# Patient Record
Sex: MALE | Race: OTHER | Employment: FULL TIME | URBAN - METROPOLITAN AREA
[De-identification: names, ages, dates, MRNs, and addresses within clinical notes are randomized per-mention and may not be internally consistent; named-entity substitution may affect disease eponyms.]

---

## 2017-01-11 ENCOUNTER — OFFICE VISIT (OUTPATIENT)
Dept: FAMILY MEDICINE CLINIC | Facility: CLINIC | Age: 42
End: 2017-01-11

## 2017-01-11 VITALS
WEIGHT: 144 LBS | HEART RATE: 77 BPM | HEIGHT: 62 IN | BODY MASS INDEX: 26.5 KG/M2 | SYSTOLIC BLOOD PRESSURE: 110 MMHG | DIASTOLIC BLOOD PRESSURE: 78 MMHG | TEMPERATURE: 98 F | RESPIRATION RATE: 15 BRPM

## 2017-01-11 DIAGNOSIS — G40.909 NONINTRACTABLE EPILEPSY WITHOUT STATUS EPILEPTICUS, UNSPECIFIED EPILEPSY TYPE (HCC): ICD-10-CM

## 2017-01-11 DIAGNOSIS — Z00.00 ROUTINE PHYSICAL EXAMINATION: Primary | ICD-10-CM

## 2017-01-11 PROCEDURE — 99396 PREV VISIT EST AGE 40-64: CPT | Performed by: FAMILY MEDICINE

## 2017-01-11 PROCEDURE — G0438 PPPS, INITIAL VISIT: HCPCS | Performed by: FAMILY MEDICINE

## 2017-01-11 NOTE — PROGRESS NOTES
Blood pressure 110/78, pulse 77, temperature 97.8 °F (36.6 °C), temperature source Oral, resp. rate 15, height 5' 2\" (1.575 m), weight 144 lb (65.318 kg).   REASON FOR VISIT:    Dragan Contreras is a 37year old male who presents for an [facility] previous visit. Fecal Occult Blood  Annually No results found for: FOB, OCCULTSTOOL    Obesity Screening Screen all adults annually Body mass index is 26.33 kg/(m^2).       Preventive Services for Which Recommendations Vary with Risk Recommendation Inter ALLERGIES:   No Known Allergies    CURRENT MEDICATIONS:     Current Outpatient Prescriptions:  levETIRAcetam (KEPPRA) 500 MG Oral Tab Take 1 tablet (500 mg total) by mouth 2 (two) times daily.  (Patient taking differently: Take 250 mg by mouth dashawn clear to auscultation  CARDIO: RRR without murmur  GI: good BS's, no masses, HSM or tenderness  : two descended testes, no masses, no hernia, no penile lesions  RECTAL: good rectal tone, prostate shows no masses  MUSCULOSKELETAL: back is not tender, FROM

## 2017-01-14 ENCOUNTER — APPOINTMENT (OUTPATIENT)
Dept: LAB | Age: 42
End: 2017-01-14
Attending: FAMILY MEDICINE
Payer: COMMERCIAL

## 2017-01-14 DIAGNOSIS — G40.909 NONINTRACTABLE EPILEPSY WITHOUT STATUS EPILEPTICUS, UNSPECIFIED EPILEPSY TYPE (HCC): ICD-10-CM

## 2017-01-14 DIAGNOSIS — Z00.00 ROUTINE PHYSICAL EXAMINATION: ICD-10-CM

## 2017-01-14 LAB
CHOLEST SERPL-MCNC: 168 MG/DL (ref 110–200)
GLUCOSE SERPL-MCNC: 100 MG/DL (ref 70–99)
HDLC SERPL-MCNC: 38 MG/DL
LDLC SERPL CALC-MCNC: 106 MG/DL (ref 0–99)
NONHDLC SERPL-MCNC: 130 MG/DL
TRIGL SERPL-MCNC: 120 MG/DL (ref 1–149)
TSH SERPL-ACNC: 1.11 UIU/ML (ref 0.34–5.6)

## 2017-01-14 PROCEDURE — 84443 ASSAY THYROID STIM HORMONE: CPT

## 2017-01-14 PROCEDURE — 82947 ASSAY GLUCOSE BLOOD QUANT: CPT

## 2017-01-14 PROCEDURE — 80061 LIPID PANEL: CPT

## 2017-01-14 PROCEDURE — 36415 COLL VENOUS BLD VENIPUNCTURE: CPT

## 2017-01-14 PROCEDURE — 80177 DRUG SCRN QUAN LEVETIRACETAM: CPT

## 2017-01-17 LAB — LEVETIRACETAM (KEPPRA): <2 UG/ML

## 2017-01-18 ENCOUNTER — TELEPHONE (OUTPATIENT)
Dept: FAMILY MEDICINE CLINIC | Facility: CLINIC | Age: 42
End: 2017-01-18

## 2017-01-18 DIAGNOSIS — G40.909 NONINTRACTABLE EPILEPSY WITHOUT STATUS EPILEPTICUS, UNSPECIFIED EPILEPSY TYPE (HCC): Primary | ICD-10-CM

## 2017-03-24 ENCOUNTER — TELEPHONE (OUTPATIENT)
Dept: NEUROLOGY | Facility: CLINIC | Age: 42
End: 2017-03-24

## 2017-03-27 ENCOUNTER — OFFICE VISIT (OUTPATIENT)
Dept: NEUROLOGY | Facility: CLINIC | Age: 42
End: 2017-03-27

## 2017-03-27 VITALS
HEART RATE: 86 BPM | WEIGHT: 144 LBS | BODY MASS INDEX: 26.5 KG/M2 | HEIGHT: 62 IN | OXYGEN SATURATION: 97 % | SYSTOLIC BLOOD PRESSURE: 118 MMHG | DIASTOLIC BLOOD PRESSURE: 74 MMHG

## 2017-03-27 DIAGNOSIS — G40.909 SEIZURE DISORDER (HCC): Primary | ICD-10-CM

## 2017-03-27 PROCEDURE — 99213 OFFICE O/P EST LOW 20 MIN: CPT | Performed by: OTHER

## 2017-03-27 NOTE — PROGRESS NOTES
Audrey Reyna : 1975     HPI:   Patient presents with:  Seizure Disorder (neurologic): LOV:05/10/16 with Anny Feliciano. Blood test for LEVETIRACETAM level done on 17 here to discuss results.  Pt reports he has not had any seizure activity since lov Number of children:               Social History Main Topics    Smoking Status: Never Smoker                      Smokeless Status: Never Used                        Alcohol Use: No              Drug Use: No            Other Topics            Concer no clonus  Coordination: Finger to nose, heel to shin intact bilaterally. Gait: Narrow based, negative Romberg’s sign. Can stand on heels and toes.     ASSESSMENT AND PLAN:     Mickey Zhou 37year old male presents to clinic with history of a seizure

## 2017-09-07 ENCOUNTER — OFFICE VISIT (OUTPATIENT)
Dept: NEUROLOGY | Facility: CLINIC | Age: 42
End: 2017-09-07

## 2017-09-07 VITALS
WEIGHT: 140 LBS | BODY MASS INDEX: 23.32 KG/M2 | SYSTOLIC BLOOD PRESSURE: 102 MMHG | RESPIRATION RATE: 16 BRPM | HEIGHT: 65 IN | HEART RATE: 64 BPM | DIASTOLIC BLOOD PRESSURE: 64 MMHG

## 2017-09-07 DIAGNOSIS — R56.9 SEIZURE (HCC): ICD-10-CM

## 2017-09-07 PROCEDURE — 99213 OFFICE O/P EST LOW 20 MIN: CPT | Performed by: OTHER

## 2017-09-07 RX ORDER — LEVETIRACETAM 500 MG/1
250 TABLET ORAL 2 TIMES DAILY
Qty: 90 TABLET | Refills: 3 | Status: SHIPPED | OUTPATIENT
Start: 2017-09-07 | End: 2017-12-06

## 2017-09-07 RX ORDER — LEVETIRACETAM 500 MG/1
250 TABLET ORAL DAILY
COMMUNITY
Start: 2017-09-07 | End: 2017-09-07

## 2017-09-07 NOTE — PROGRESS NOTES
Tracie Flores : 1975     HPI:   Patient presents with:  Seizure Disorder (neurologic): LOV: 3/27/17. F/U on seizures. Patient states that he has been doing well since last visit. He has not had any seizures and has not had any new symptoms.  Patient Smokeless tobacco: Never Used                        Alcohol use:  No              Drug use: No            Other Topics            Concern  Caffeine Concern        No    Comment:1 cup coffee daily  Sleep Concern           No  Exercise lower extremities,  No Babinski, no hoffmans, no clonus  Coordination: Finger to nose, heel to shin intact bilaterally. Gait: Narrow based, negative Romberg’s sign. Can stand on heels and toes.     ASSESSMENT AND PLAN:     Kwasi Chappell 40year old male

## 2018-03-19 ENCOUNTER — TELEPHONE (OUTPATIENT)
Dept: FAMILY MEDICINE CLINIC | Facility: CLINIC | Age: 43
End: 2018-03-19

## 2018-03-19 ENCOUNTER — NURSE TRIAGE (OUTPATIENT)
Dept: FAMILY MEDICINE CLINIC | Facility: CLINIC | Age: 43
End: 2018-03-19

## 2018-03-19 DIAGNOSIS — G40.A09 NONINTRACTABLE ABSENCE EPILEPSY WITHOUT STATUS EPILEPTICUS (HCC): Primary | ICD-10-CM

## 2018-03-19 DIAGNOSIS — E78.00 PURE HYPERCHOLESTEROLEMIA: ICD-10-CM

## 2018-03-19 NOTE — TELEPHONE ENCOUNTER
Italian speaking - pt stts he would like a refill on Rx Paroxetine 10 MG.   Pt has appt for PX 4/10Please advise

## 2018-03-20 NOTE — TELEPHONE ENCOUNTER
IWLL GRISSOM [facility]    Called patient and advised Dr Donell Farley note, was able to make OV for tomorrow and then changed his mind, cancelled the OV for tomorrow and states that he will wait until next OV for annual px 4/10/18, advised to go to ER if symptoms getting worse, advised  that medcation cannot be refilled until the next OV, states that he will wait next month. Note      PATIENT TO COME IN FOR APPT TOMORROW.

## 2018-04-07 ENCOUNTER — APPOINTMENT (OUTPATIENT)
Dept: LAB | Age: 43
End: 2018-04-07
Attending: FAMILY MEDICINE
Payer: COMMERCIAL

## 2018-04-07 DIAGNOSIS — G40.A09 NONINTRACTABLE ABSENCE EPILEPSY WITHOUT STATUS EPILEPTICUS (HCC): ICD-10-CM

## 2018-04-07 DIAGNOSIS — E78.00 PURE HYPERCHOLESTEROLEMIA: ICD-10-CM

## 2018-04-07 PROCEDURE — 80061 LIPID PANEL: CPT

## 2018-04-07 PROCEDURE — 80048 BASIC METABOLIC PNL TOTAL CA: CPT

## 2018-04-07 PROCEDURE — 84460 ALANINE AMINO (ALT) (SGPT): CPT

## 2018-04-07 PROCEDURE — 84443 ASSAY THYROID STIM HORMONE: CPT

## 2018-04-07 PROCEDURE — 80177 DRUG SCRN QUAN LEVETIRACETAM: CPT

## 2018-04-07 PROCEDURE — 36415 COLL VENOUS BLD VENIPUNCTURE: CPT

## 2018-04-07 PROCEDURE — 84450 TRANSFERASE (AST) (SGOT): CPT

## 2018-04-10 ENCOUNTER — OFFICE VISIT (OUTPATIENT)
Dept: FAMILY MEDICINE CLINIC | Facility: CLINIC | Age: 43
End: 2018-04-10

## 2018-04-10 VITALS
BODY MASS INDEX: 23.67 KG/M2 | HEIGHT: 65 IN | HEART RATE: 70 BPM | DIASTOLIC BLOOD PRESSURE: 67 MMHG | SYSTOLIC BLOOD PRESSURE: 106 MMHG | WEIGHT: 142.06 LBS

## 2018-04-10 DIAGNOSIS — G40.802 OTHER EPILEPSY WITHOUT STATUS EPILEPTICUS, NOT INTRACTABLE (HCC): ICD-10-CM

## 2018-04-10 DIAGNOSIS — F52.4 PREMATURE EJACULATION: ICD-10-CM

## 2018-04-10 DIAGNOSIS — Z00.00 ROUTINE PHYSICAL EXAMINATION: Primary | ICD-10-CM

## 2018-04-10 PROCEDURE — 99396 PREV VISIT EST AGE 40-64: CPT | Performed by: FAMILY MEDICINE

## 2018-04-10 RX ORDER — LEVETIRACETAM 500 MG/1
TABLET ORAL
Refills: 3 | COMMUNITY
Start: 2018-03-03 | End: 2019-03-04

## 2018-04-10 NOTE — PROGRESS NOTES
Blood pressure 106/67, pulse 70, height 5' 5\" (1.651 m), weight 142 lb 1 oz (64.4 kg). REASON FOR VISIT:    Turner Badillo is a 40year old male who presents for an [address].         Patient Active Problem List:     Routine general medica Screen pts at high risk plus screen one time for adults born [address] No results found for: HCVAB   Tuberculosis Screen If high risk No components found for: [address] Internal Lab or Procedure   No disease specific di are clear  EYES:PERRLA, EOMI, conjunctiva are clear.     NECK: supple, no adenopathy, no bruits  CHEST: no chest tenderness  LUNGS: clear to auscultation  CARDIO: RRR without murmur  GI: good BS's, no masses, HSM or tenderness  : two descended testes, no

## 2018-04-27 ENCOUNTER — OFFICE VISIT (OUTPATIENT)
Dept: FAMILY MEDICINE CLINIC | Facility: CLINIC | Age: 43
End: 2018-04-27

## 2018-04-27 ENCOUNTER — NURSE TRIAGE (OUTPATIENT)
Dept: OTHER | Age: 43
End: 2018-04-27

## 2018-04-27 VITALS
WEIGHT: 140 LBS | HEIGHT: 65 IN | SYSTOLIC BLOOD PRESSURE: 112 MMHG | DIASTOLIC BLOOD PRESSURE: 76 MMHG | BODY MASS INDEX: 23.32 KG/M2 | HEART RATE: 66 BPM

## 2018-04-27 DIAGNOSIS — N48.1 BALANITIS: Primary | ICD-10-CM

## 2018-04-27 PROCEDURE — 99213 OFFICE O/P EST LOW 20 MIN: CPT | Performed by: FAMILY MEDICINE

## 2018-04-27 PROCEDURE — 99212 OFFICE O/P EST SF 10 MIN: CPT | Performed by: FAMILY MEDICINE

## 2018-04-27 RX ORDER — FLUCONAZOLE 150 MG/1
150 TABLET ORAL ONCE
Qty: 1 TABLET | Refills: 1 | Status: SHIPPED | OUTPATIENT
Start: 2018-04-27 | End: 2018-04-27

## 2018-04-27 NOTE — PATIENT INSTRUCTIONS
Balanitis [Balanitis]    La Balanitis es evert inflamación en la eyal del pene (glande) Puede ocurrir a partir de evert acumulación de gérmenes (bacterias o virus) bajo el prepucio. Con mayor frecuencia se trata de evert complicación de la diabetes.  Nubia Lack

## 2018-04-27 NOTE — PROGRESS NOTES
Blood pressure 112/76, pulse 66, height 5' 5\" (1.651 m), weight 140 lb (63.5 kg). Two-week history of redness and white residue around his penis. He has not been circumcised. He reports that he otherwise feels well.   Denies dysuria or penile discharg

## 2018-04-27 NOTE — TELEPHONE ENCOUNTER
Action Requested: Summary for Provider     []  Critical Lab, Recommendations Needed  [] Need Additional Advice  []   FYI    []   Need Orders  [] Need Medications Sent to Pharmacy  []  Other     SUMMARY: Per kwesi Mcgowan for pt to come to office now.     Pt state

## 2018-06-07 ENCOUNTER — OFFICE VISIT (OUTPATIENT)
Dept: SURGERY | Facility: CLINIC | Age: 43
End: 2018-06-07

## 2018-06-07 ENCOUNTER — TELEPHONE (OUTPATIENT)
Dept: SURGERY | Facility: CLINIC | Age: 43
End: 2018-06-07

## 2018-06-07 VITALS
WEIGHT: 140 LBS | TEMPERATURE: 97 F | HEIGHT: 65 IN | BODY MASS INDEX: 23.32 KG/M2 | DIASTOLIC BLOOD PRESSURE: 68 MMHG | RESPIRATION RATE: 16 BRPM | SYSTOLIC BLOOD PRESSURE: 112 MMHG | HEART RATE: 71 BPM

## 2018-06-07 DIAGNOSIS — N47.1 PHIMOSIS: Primary | ICD-10-CM

## 2018-06-07 DIAGNOSIS — N48.1 BALANITIS: ICD-10-CM

## 2018-06-07 DIAGNOSIS — Z01.818 PREOP EXAMINATION: ICD-10-CM

## 2018-06-07 DIAGNOSIS — N48.1 BALANITIS: Primary | ICD-10-CM

## 2018-06-07 PROCEDURE — 99212 OFFICE O/P EST SF 10 MIN: CPT | Performed by: UROLOGY

## 2018-06-07 PROCEDURE — 99244 OFF/OP CNSLTJ NEW/EST MOD 40: CPT | Performed by: UROLOGY

## 2018-06-07 NOTE — TELEPHONE ENCOUNTER
Patient seen in office, scheduled for circumcision, Wednesday 08/01/18 @ 7:30, [facility], went over labs/pre-op with patient verbalized understanding.

## 2018-06-07 NOTE — PROGRESS NOTES
Shu Campbell is a 40year old male. HPI:   Patient presents with:  Circumcision: possible--pt states redness around the penis       81-year-old male who I seen previously for evaluation of premature ejaculation April 28, 2016.   Had been using Paxil 10 for management including intermittent use of topical antifungal or steroid ointments versus definitive circumcision. The risks benefits side effects and complications related to each of those were discussed with the patient.   Would prefer to proceed with

## 2018-07-24 ENCOUNTER — LAB ENCOUNTER (OUTPATIENT)
Dept: LAB | Age: 43
End: 2018-07-24
Attending: UROLOGY
Payer: COMMERCIAL

## 2018-07-24 DIAGNOSIS — Z01.818 PREOP EXAMINATION: ICD-10-CM

## 2018-07-24 LAB
ANION GAP SERPL CALC-SCNC: 9 MMOL/L (ref 0–18)
BASOPHILS # BLD: 0 K/UL (ref 0–0.2)
BASOPHILS NFR BLD: 0 %
BUN SERPL-MCNC: 8 MG/DL (ref 8–20)
BUN/CREAT SERPL: 7.5 (ref 10–20)
CALCIUM SERPL-MCNC: 9.2 MG/DL (ref 8.5–10.5)
CHLORIDE SERPL-SCNC: 104 MMOL/L (ref 95–110)
CO2 SERPL-SCNC: 26 MMOL/L (ref 22–32)
CREAT SERPL-MCNC: 1.06 MG/DL (ref 0.5–1.5)
EOSINOPHIL # BLD: 0.1 K/UL (ref 0–0.7)
EOSINOPHIL NFR BLD: 1 %
ERYTHROCYTE [DISTWIDTH] IN BLOOD BY AUTOMATED COUNT: 13 % (ref 11–15)
GLUCOSE SERPL-MCNC: 87 MG/DL (ref 70–99)
HCT VFR BLD AUTO: 48.2 % (ref 41–52)
HGB BLD-MCNC: 16 G/DL (ref 13.5–17.5)
LYMPHOCYTES # BLD: 1.4 K/UL (ref 1–4)
LYMPHOCYTES NFR BLD: 24 %
MCH RBC QN AUTO: 29.5 PG (ref 27–32)
MCHC RBC AUTO-ENTMCNC: 33.2 G/DL (ref 32–37)
MCV RBC AUTO: 88.9 FL (ref 80–100)
MONOCYTES # BLD: 0.6 K/UL (ref 0–1)
MONOCYTES NFR BLD: 10 %
NEUTROPHILS # BLD AUTO: 3.9 K/UL (ref 1.8–7.7)
NEUTROPHILS NFR BLD: 65 %
OSMOLALITY UR CALC.SUM OF ELEC: 286 MOSM/KG (ref 275–295)
PLATELET # BLD AUTO: 191 K/UL (ref 140–400)
PMV BLD AUTO: 9.6 FL (ref 7.4–10.3)
POTASSIUM SERPL-SCNC: 4.7 MMOL/L (ref 3.3–5.1)
RBC # BLD AUTO: 5.42 M/UL (ref 4.5–5.9)
SODIUM SERPL-SCNC: 139 MMOL/L (ref 136–144)
WBC # BLD AUTO: 5.9 K/UL (ref 4–11)

## 2018-07-24 PROCEDURE — 80048 BASIC METABOLIC PNL TOTAL CA: CPT

## 2018-07-24 PROCEDURE — 36415 COLL VENOUS BLD VENIPUNCTURE: CPT

## 2018-07-24 PROCEDURE — 85025 COMPLETE CBC W/AUTO DIFF WBC: CPT

## 2018-07-30 ENCOUNTER — TELEPHONE (OUTPATIENT)
Dept: SURGERY | Facility: CLINIC | Age: 43
End: 2018-07-30

## 2018-07-30 NOTE — TELEPHONE ENCOUNTER
Pt would like to cancel 8/1 sx due to copayments. Pls call to confirm sx has been cancelled. Thank you.

## 2018-07-31 NOTE — TELEPHONE ENCOUNTER
Spoke with patrizia, confirmed that patient' procedure is cancelled. L/m on patient' v/m confirming that procedure is cancelled for Wednesday 08/01/18.

## 2018-10-25 ENCOUNTER — OFFICE VISIT (OUTPATIENT)
Dept: FAMILY MEDICINE CLINIC | Facility: CLINIC | Age: 43
End: 2018-10-25

## 2018-10-25 VITALS
HEART RATE: 69 BPM | TEMPERATURE: 98 F | WEIGHT: 143 LBS | DIASTOLIC BLOOD PRESSURE: 78 MMHG | SYSTOLIC BLOOD PRESSURE: 125 MMHG | HEIGHT: 65 IN | BODY MASS INDEX: 23.82 KG/M2

## 2018-10-25 DIAGNOSIS — J06.9 URI, ACUTE: Primary | ICD-10-CM

## 2018-10-25 PROCEDURE — 99213 OFFICE O/P EST LOW 20 MIN: CPT | Performed by: FAMILY MEDICINE

## 2018-10-25 PROCEDURE — 87880 STREP A ASSAY W/OPTIC: CPT | Performed by: FAMILY MEDICINE

## 2018-10-25 PROCEDURE — 99212 OFFICE O/P EST SF 10 MIN: CPT | Performed by: FAMILY MEDICINE

## 2018-10-25 RX ORDER — IBUPROFEN 800 MG/1
800 TABLET ORAL EVERY 6 HOURS PRN
Qty: 90 TABLET | Refills: 0 | Status: SHIPPED | OUTPATIENT
Start: 2018-10-25 | End: 2020-02-11

## 2018-10-25 NOTE — PROGRESS NOTES
Blood pressure 125/78, pulse 69, temperature 97.9 °F (36.6 °C), temperature source Oral, height 5' 5\" (1.651 m), weight 143 lb (64.9 kg). Patient presents today reporting he has had a sore throat for 3 days.   Minimal nasal congestion minimal cough that

## 2018-10-25 NOTE — PATIENT INSTRUCTIONS
Tromboflebitis superficial [Thrombophlebitis, Superficial]  Las venas superficiales son las venas que están cerca de la superficie de la piel.  La tromboflebitis superficial es un problema que ocurre cuando evert o más de estas venas se inflaman (se enrojec ? Use medias de compresión elásticas o vendajes megan le indiquen. ? Evite quedarse de pie o sentado quinten mucho rato. Levántese y camine con frecuencia.   Para ayudar a tratar un coágulo de chuy, es posible que le receten un diluyente de la chuy (ant

## 2019-03-04 NOTE — TELEPHONE ENCOUNTER
Pt is calling in reg to medication refill he is out          Current Outpatient Medications:        levETIRAcetam 500 MG Oral Tab  Disp:  Rfl: 3

## 2019-03-05 RX ORDER — LEVETIRACETAM 500 MG/1
TABLET ORAL
Qty: 30 TABLET | Refills: 3 | Status: SHIPPED | OUTPATIENT
Start: 2019-03-05 | End: 2019-10-22

## 2019-03-05 NOTE — TELEPHONE ENCOUNTER
Requested Prescriptions     Pending Prescriptions Disp Refills   • levETIRAcetam 500 MG Oral Tab  3       Last Office Visit with PCP: 10/25/2018  Last Blood Pressures:  BP Readings from Last 2 Encounters:  10/25/18 : 125/78  06/07/18 : 112/68    Rx listed

## 2019-10-26 RX ORDER — LEVETIRACETAM 500 MG/1
TABLET ORAL
Qty: 30 TABLET | Refills: 0 | OUTPATIENT
Start: 2019-10-26

## 2019-10-26 RX ORDER — LEVETIRACETAM 500 MG/1
TABLET ORAL
Qty: 45 TABLET | Refills: 0 | Status: SHIPPED | OUTPATIENT
Start: 2019-10-26 | End: 2019-11-20

## 2019-10-26 NOTE — TELEPHONE ENCOUNTER
SYLVESTER please assist with scheduling 6 month f/u appt, pt does not have mychart, thanks    Refill passed per [facility], [facility] protocol.   Refill Protocol Appointment Criteria  · Appointment scheduled in the past 6 months or in the next 3 months  Recent Outpati

## 2019-11-20 ENCOUNTER — OFFICE VISIT (OUTPATIENT)
Dept: FAMILY MEDICINE CLINIC | Facility: CLINIC | Age: 44
End: 2019-11-20

## 2019-11-20 VITALS
WEIGHT: 142 LBS | BODY MASS INDEX: 23.66 KG/M2 | SYSTOLIC BLOOD PRESSURE: 116 MMHG | DIASTOLIC BLOOD PRESSURE: 81 MMHG | HEART RATE: 76 BPM | HEIGHT: 65 IN

## 2019-11-20 DIAGNOSIS — G40.802 OTHER EPILEPSY WITHOUT STATUS EPILEPTICUS, NOT INTRACTABLE (HCC): Primary | ICD-10-CM

## 2019-11-20 PROCEDURE — 99213 OFFICE O/P EST LOW 20 MIN: CPT | Performed by: FAMILY MEDICINE

## 2019-11-20 RX ORDER — LEVETIRACETAM 250 MG/1
TABLET ORAL
Qty: 60 TABLET | Refills: 3 | Status: SHIPPED | OUTPATIENT
Start: 2019-11-20 | End: 2020-03-04

## 2019-11-20 NOTE — PROGRESS NOTES
Blood pressure 116/81, pulse 76, height 5' 5\" (1.651 m), weight 142 lb (64.4 kg). Patient presents today following up for epilepsy. He reports he had a seizure last about 15 years ago. He has had breakthrough epilepsy when off Keppra.   He otherwise f

## 2020-01-08 ENCOUNTER — OFFICE VISIT (OUTPATIENT)
Dept: FAMILY MEDICINE CLINIC | Facility: CLINIC | Age: 45
End: 2020-01-08

## 2020-01-08 VITALS
BODY MASS INDEX: 23.32 KG/M2 | HEIGHT: 65 IN | DIASTOLIC BLOOD PRESSURE: 76 MMHG | HEART RATE: 69 BPM | WEIGHT: 140 LBS | SYSTOLIC BLOOD PRESSURE: 120 MMHG

## 2020-01-08 DIAGNOSIS — F52.4 PREMATURE EJACULATION: ICD-10-CM

## 2020-01-08 DIAGNOSIS — F32.A DEPRESSION, UNSPECIFIED DEPRESSION TYPE: ICD-10-CM

## 2020-01-08 DIAGNOSIS — G40.802 OTHER EPILEPSY WITHOUT STATUS EPILEPTICUS, NOT INTRACTABLE (HCC): ICD-10-CM

## 2020-01-08 DIAGNOSIS — Z00.00 ROUTINE PHYSICAL EXAMINATION: Primary | ICD-10-CM

## 2020-01-08 PROCEDURE — G0438 PPPS, INITIAL VISIT: HCPCS | Performed by: FAMILY MEDICINE

## 2020-01-08 PROCEDURE — 99396 PREV VISIT EST AGE 40-64: CPT | Performed by: FAMILY MEDICINE

## 2020-01-08 PROCEDURE — 99213 OFFICE O/P EST LOW 20 MIN: CPT | Performed by: FAMILY MEDICINE

## 2020-01-08 PROCEDURE — 90471 IMMUNIZATION ADMIN: CPT | Performed by: FAMILY MEDICINE

## 2020-01-08 PROCEDURE — 90686 IIV4 VACC NO PRSV 0.5 ML IM: CPT | Performed by: FAMILY MEDICINE

## 2020-01-08 RX ORDER — PAROXETINE 10 MG/1
10 TABLET, FILM COATED ORAL EVERY MORNING
Qty: 30 TABLET | Refills: 2 | Status: SHIPPED | OUTPATIENT
Start: 2020-01-08 | End: 2020-02-11

## 2020-01-08 NOTE — PROGRESS NOTES
REASON FOR VISIT:    Turner Badillo is a 38year old male who presents for an [address]. Depression over family situation  has days where he feels like crying. No suicidal ideation no substance abuse.   He is interested in seeing a time for adults born [address] No results found for: HCVAB   Tuberculosis Screen If high risk No components found for: [address] Internal Lab or Procedure   No disease specific diagnoses    ALLERGIES:   No Known Allergi Encounters:  01/08/20 : 120/76  11/20/19 : 116/81  10/25/18 : 125/78      GENERAL: well developed, well nourished, in no apparent distress  SKIN: no rashes, no suspicious lesions  HEENT: atraumatic, normocephalic, ears and throat are clear  EYES:AD [place] Future  - GLUCOSE, SERUM; Future    3. Other epilepsy without status epilepticus, not intractable ([address]  Continue lamotrigine has follow-up appointment with neurology    4.  Premature ejaculation  Paroxetine

## 2020-01-11 ENCOUNTER — APPOINTMENT (OUTPATIENT)
Dept: LAB | Age: 45
End: 2020-01-11
Attending: FAMILY MEDICINE
Payer: COMMERCIAL

## 2020-01-11 DIAGNOSIS — F32.A DEPRESSION, UNSPECIFIED DEPRESSION TYPE: ICD-10-CM

## 2020-01-11 LAB
CHOLEST SMN-MCNC: 145 MG/DL (ref ?–200)
GLUCOSE BLD-MCNC: 88 MG/DL (ref 70–99)
HDLC SERPL-MCNC: 41 MG/DL (ref 40–59)
LDLC SERPL CALC-MCNC: 79 MG/DL (ref ?–100)
NONHDLC SERPL-MCNC: 104 MG/DL (ref ?–130)
PATIENT FASTING Y/N/NP: YES
PATIENT FASTING Y/N/NP: YES
TRIGL SERPL-MCNC: 123 MG/DL (ref 30–149)
TSI SER-ACNC: 0.9 MIU/ML (ref 0.36–3.74)
VLDLC SERPL CALC-MCNC: 25 MG/DL (ref 0–30)

## 2020-01-11 PROCEDURE — 84443 ASSAY THYROID STIM HORMONE: CPT

## 2020-01-11 PROCEDURE — 82947 ASSAY GLUCOSE BLOOD QUANT: CPT

## 2020-01-11 PROCEDURE — 80061 LIPID PANEL: CPT

## 2020-01-11 PROCEDURE — 36415 COLL VENOUS BLD VENIPUNCTURE: CPT

## 2020-01-13 ENCOUNTER — TELEPHONE (OUTPATIENT)
Dept: FAMILY MEDICINE CLINIC | Facility: CLINIC | Age: 45
End: 2020-01-13

## 2020-01-13 NOTE — TELEPHONE ENCOUNTER
Marilou Dean B, DO  [facility]             Labs normal.  Patient to fu in one month to see me for appt.

## 2020-01-14 NOTE — TELEPHONE ENCOUNTER
Pt returned phone call. Relayed results and scheduled a f/u appt w/ Dr. Sukumar Bush. Pt expressed understanding.

## 2020-01-25 RX ORDER — LEVETIRACETAM 500 MG/1
TABLET ORAL
Qty: 45 TABLET | Refills: 0 | OUTPATIENT
Start: 2020-01-25

## 2020-02-11 ENCOUNTER — OFFICE VISIT (OUTPATIENT)
Dept: FAMILY MEDICINE CLINIC | Facility: CLINIC | Age: 45
End: 2020-02-11

## 2020-02-11 VITALS
HEIGHT: 65 IN | SYSTOLIC BLOOD PRESSURE: 114 MMHG | HEART RATE: 61 BPM | BODY MASS INDEX: 23.32 KG/M2 | WEIGHT: 140 LBS | DIASTOLIC BLOOD PRESSURE: 76 MMHG | TEMPERATURE: 98 F

## 2020-02-11 DIAGNOSIS — F32.A DEPRESSION, UNSPECIFIED DEPRESSION TYPE: ICD-10-CM

## 2020-02-11 DIAGNOSIS — J06.9 URI, ACUTE: Primary | ICD-10-CM

## 2020-02-11 DIAGNOSIS — J02.9 SORE THROAT: ICD-10-CM

## 2020-02-11 LAB
CONTROL LINE PRESENT WITH A CLEAR BACKGROUND (YES/NO): YES YES/NO
KIT LOT #: NORMAL NUMERIC
STREP GRP A CUL-SCR: NEGATIVE

## 2020-02-11 PROCEDURE — 99213 OFFICE O/P EST LOW 20 MIN: CPT | Performed by: FAMILY MEDICINE

## 2020-02-11 PROCEDURE — 87880 STREP A ASSAY W/OPTIC: CPT | Performed by: FAMILY MEDICINE

## 2020-02-11 RX ORDER — ESCITALOPRAM OXALATE 5 MG/1
5 TABLET ORAL DAILY
Qty: 30 TABLET | Refills: 2 | Status: SHIPPED | OUTPATIENT
Start: 2020-02-11 | End: 2020-03-19

## 2020-02-11 NOTE — PROGRESS NOTES
Blood pressure 114/76, pulse 61, temperature 98.1 °F (36.7 °C), temperature source Oral, height 5' 5\" (1.651 m), weight 140 lb (63.5 kg). Patient presents today following up for depression. Family situation is the same.   Denies suicidal ideation or pa

## 2020-03-04 ENCOUNTER — OFFICE VISIT (OUTPATIENT)
Dept: NEUROLOGY | Facility: CLINIC | Age: 45
End: 2020-03-04

## 2020-03-04 VITALS — BODY MASS INDEX: 23.32 KG/M2 | HEIGHT: 65 IN | WEIGHT: 140 LBS

## 2020-03-04 DIAGNOSIS — G40.909 NONINTRACTABLE EPILEPSY WITHOUT STATUS EPILEPTICUS, UNSPECIFIED EPILEPSY TYPE (HCC): Primary | ICD-10-CM

## 2020-03-04 PROCEDURE — 99214 OFFICE O/P EST MOD 30 MIN: CPT | Performed by: OTHER

## 2020-03-04 RX ORDER — LEVETIRACETAM 250 MG/1
TABLET ORAL
Qty: 180 TABLET | Refills: 3 | Status: SHIPPED | OUTPATIENT
Start: 2020-03-04 | End: 2020-09-29

## 2020-03-04 NOTE — PROGRESS NOTES
Neurology Follow up Visit     Referred By: Dr. Lucy Marie    Chief Complaint: Patient presents with:  Seizure Disorder: LOV: 3/27/17 with Dr. Yadiel Stearns. F/U On seizures. Patient states that he has remained seizure free for about 13 years.  He states that he NAUSEA ONLY    ROS:   As in HPI, the rest of the 14 system review was done and was negative      Physical Exam:   03/04/20  1458   Weight: 140 lb (63.5 kg)   Height: 65\"       General: No apparent distress, well nourished, well groomed.   Head- No Component Value Date    HGB 16.0 07/24/2018    HCT 48.2 07/24/2018    MCV 88.9 07/24/2018    WBC 5.9 07/24/2018     07/24/2018      Lab Results   Component Value Date    BUN 8 07/24/2018    CA 9.2 07/24/2018    ALT 18 04/07/2018    AST 20 04/07/20

## 2020-03-19 RX ORDER — ESCITALOPRAM OXALATE 5 MG/1
5 TABLET ORAL DAILY
Qty: 90 TABLET | Refills: 1 | Status: SHIPPED | OUTPATIENT
Start: 2020-03-19 | End: 2020-09-30

## 2020-03-19 NOTE — TELEPHONE ENCOUNTER
Patient requesting refill.  He rescheduled his follow up appointment for May 1st       escitalopram 5 MG Oral Tab

## 2020-05-07 ENCOUNTER — NURSE TRIAGE (OUTPATIENT)
Dept: FAMILY MEDICINE CLINIC | Facility: CLINIC | Age: 45
End: 2020-05-07

## 2020-05-07 NOTE — TELEPHONE ENCOUNTER
Action Requested: Summary for Provider     []  Critical Lab, Recommendations Needed  [] Need Additional Advice  []   FYI    []   Need Orders  [] Need Medications Sent to Pharmacy  []  Other     SUMMARY: Patient reports concerns of covid exposure, co-worker

## 2020-08-11 LAB — AMB EXT COVID-19 RESULT: DETECTED

## 2020-08-13 ENCOUNTER — NURSE TRIAGE (OUTPATIENT)
Dept: FAMILY MEDICINE CLINIC | Facility: CLINIC | Age: 45
End: 2020-08-13

## 2020-08-13 NOTE — TELEPHONE ENCOUNTER
Please reply to pool: EM RN TRIAGE    Action Requested: Summary for Provider     []  Critical Lab, Recommendations Needed  [] Need Additional Advice  [x]   FYI    []   Need Orders  [] Need Medications Sent to Pharmacy  []  Other      SUMMARY: With Erika Hazard

## 2020-08-14 ENCOUNTER — PATIENT OUTREACH (OUTPATIENT)
Dept: CASE MANAGEMENT | Age: 45
End: 2020-08-14

## 2020-08-14 ENCOUNTER — TELEPHONE (OUTPATIENT)
Dept: FAMILY MEDICINE CLINIC | Facility: CLINIC | Age: 45
End: 2020-08-14

## 2020-08-14 DIAGNOSIS — U07.1 COVID-19: ICD-10-CM

## 2020-08-14 PROCEDURE — E0445 OXIMETER NON-INVASIVE: HCPCS

## 2020-08-14 NOTE — PROGRESS NOTES
Home Monitoring Condition Update    Covid19+ test date: 8/11/20      Consent Verification:  Assessment Completed With: Patient via [employer]  , Tara Cavazos #[id number]  HIPAA Verified?   Yes    COVID-19 HOME MONITORING 8/14/2020   Exertion Level Seated patient Monday. Patient verbalized understanding and denies any concerns or questions at time of call. Sent TE to PCP office re: VV. Patient advised to inform their Employee Health department or Manager when they have tested positive for COVID-19.

## 2020-08-14 NOTE — PROGRESS NOTES
Spoke to patient for day 1 home monitoring. Patient states he is unable to check her pulse and would benefit from a pulse oximeter at home. Please mail to patient as he doesn't have the means to . Patient is Solomon Islander speaking.     Please assist in obt

## 2020-08-17 ENCOUNTER — TELEMEDICINE (OUTPATIENT)
Dept: FAMILY MEDICINE CLINIC | Facility: CLINIC | Age: 45
End: 2020-08-17

## 2020-08-17 ENCOUNTER — TELEPHONE (OUTPATIENT)
Dept: FAMILY MEDICINE CLINIC | Facility: CLINIC | Age: 45
End: 2020-08-17

## 2020-08-17 DIAGNOSIS — U07.1 COVID-19: Primary | ICD-10-CM

## 2020-08-17 PROCEDURE — 99213 OFFICE O/P EST LOW 20 MIN: CPT | Performed by: FAMILY MEDICINE

## 2020-08-17 NOTE — TELEPHONE ENCOUNTER
Patient had virtual visit today. Patient advised to f/u in 3 days to determine return to work (no appt scheduled yet).     Please advise if patient is to continue home monitoring program (and indicate frequency of calls - daily, every other day, etc and whe

## 2020-08-17 NOTE — PROGRESS NOTES
Language Darin Cyrus #[id number]  Spoke to pt, confirmed need for pulse ox . Mailed to pt as no one can pick it up who is not COVID+. Pt advised insurance will be billed for it but should be covered under the new CARES act.  Pt stated he understands and is agr

## 2020-08-17 NOTE — PROGRESS NOTES
VIDEO VISIT     Patient presents today following up for COVID-19 infection symptoms began August 11. He still has loss of taste and feels tired. Denies nasal congestion or cough no fever. Denies diarrhea or sore throat no dyspnea.     Objective patient i

## 2020-08-28 ENCOUNTER — NURSE TRIAGE (OUTPATIENT)
Dept: FAMILY MEDICINE CLINIC | Facility: CLINIC | Age: 45
End: 2020-08-28

## 2020-08-28 ENCOUNTER — OFFICE VISIT (OUTPATIENT)
Dept: FAMILY MEDICINE CLINIC | Facility: CLINIC | Age: 45
End: 2020-08-28

## 2020-08-28 VITALS
HEART RATE: 63 BPM | WEIGHT: 147.19 LBS | DIASTOLIC BLOOD PRESSURE: 70 MMHG | HEIGHT: 65 IN | BODY MASS INDEX: 24.52 KG/M2 | SYSTOLIC BLOOD PRESSURE: 107 MMHG

## 2020-08-28 DIAGNOSIS — L44.9: Primary | ICD-10-CM

## 2020-08-28 PROCEDURE — 3074F SYST BP LT 130 MM HG: CPT | Performed by: FAMILY MEDICINE

## 2020-08-28 PROCEDURE — 3008F BODY MASS INDEX DOCD: CPT | Performed by: FAMILY MEDICINE

## 2020-08-28 PROCEDURE — 3078F DIAST BP <80 MM HG: CPT | Performed by: FAMILY MEDICINE

## 2020-08-28 PROCEDURE — 99213 OFFICE O/P EST LOW 20 MIN: CPT | Performed by: FAMILY MEDICINE

## 2020-08-28 RX ORDER — MOMETASONE FUROATE 1 MG/G
1 CREAM TOPICAL 2 TIMES DAILY PRN
Qty: 50 G | Refills: 0 | Status: SHIPPED | OUTPATIENT
Start: 2020-08-28 | End: 2021-01-14

## 2020-08-28 RX ORDER — LEVOCETIRIZINE DIHYDROCHLORIDE 5 MG/1
5 TABLET, FILM COATED ORAL EVERY EVENING
Qty: 30 TABLET | Refills: 2 | Status: SHIPPED | OUTPATIENT
Start: 2020-08-28 | End: 2020-11-24

## 2020-08-28 NOTE — PROGRESS NOTES
Blood pressure 107/70, pulse 63, height 5' 5\" (1.651 m), weight 147 lb 3 oz (66.8 kg). Presents today complaining of a rash on his chest and the dorsum of his right hand for the past week. He reports it itches a lot. Denies fever.   Pending COVID test

## 2020-08-28 NOTE — TELEPHONE ENCOUNTER
Action Requested: Summary for Provider     []  Critical Lab, Recommendations Needed  [] Need Additional Advice  []   FYI    []   Need Orders  [] Need Medications Sent to Pharmacy  []  Other     SUMMARY:  Per protocol advised office visit today or tomorrow.

## 2020-08-28 NOTE — TELEPHONE ENCOUNTER
With Ukrainian interpretor, patient denied cough or fever in the last 24 hours. Appointment made for today at 11:20am with Dr Horacio Root in [facility].  Advised patient that they do check temperatures at the door, do screening questions, and mask all patient

## 2020-09-29 RX ORDER — LEVETIRACETAM 250 MG/1
TABLET ORAL
Qty: 60 TABLET | Refills: 0 | Status: SHIPPED | OUTPATIENT
Start: 2020-09-29

## 2020-09-30 RX ORDER — ESCITALOPRAM OXALATE 5 MG/1
TABLET ORAL
Qty: 90 TABLET | Refills: 1 | Status: SHIPPED | OUTPATIENT
Start: 2020-09-30 | End: 2021-11-30

## 2020-11-24 RX ORDER — LEVOCETIRIZINE DIHYDROCHLORIDE 5 MG/1
TABLET, FILM COATED ORAL
Qty: 30 TABLET | Refills: 2 | Status: SHIPPED | OUTPATIENT
Start: 2020-11-24 | End: 2021-01-14

## 2021-01-14 ENCOUNTER — OFFICE VISIT (OUTPATIENT)
Dept: FAMILY MEDICINE CLINIC | Facility: CLINIC | Age: 46
End: 2021-01-14

## 2021-01-14 VITALS
HEART RATE: 76 BPM | BODY MASS INDEX: 24.16 KG/M2 | DIASTOLIC BLOOD PRESSURE: 74 MMHG | WEIGHT: 145 LBS | HEIGHT: 65 IN | SYSTOLIC BLOOD PRESSURE: 114 MMHG

## 2021-01-14 DIAGNOSIS — Z00.00 ROUTINE PHYSICAL EXAMINATION: Primary | ICD-10-CM

## 2021-01-14 DIAGNOSIS — G40.802 OTHER EPILEPSY WITHOUT STATUS EPILEPTICUS, NOT INTRACTABLE (HCC): ICD-10-CM

## 2021-01-14 PROCEDURE — 90471 IMMUNIZATION ADMIN: CPT | Performed by: FAMILY MEDICINE

## 2021-01-14 PROCEDURE — 3008F BODY MASS INDEX DOCD: CPT | Performed by: FAMILY MEDICINE

## 2021-01-14 PROCEDURE — 3074F SYST BP LT 130 MM HG: CPT | Performed by: FAMILY MEDICINE

## 2021-01-14 PROCEDURE — 90686 IIV4 VACC NO PRSV 0.5 ML IM: CPT | Performed by: FAMILY MEDICINE

## 2021-01-14 PROCEDURE — G0439 PPPS, SUBSEQ VISIT: HCPCS | Performed by: FAMILY MEDICINE

## 2021-01-14 PROCEDURE — 3078F DIAST BP <80 MM HG: CPT | Performed by: FAMILY MEDICINE

## 2021-01-14 PROCEDURE — 99396 PREV VISIT EST AGE 40-64: CPT | Performed by: FAMILY MEDICINE

## 2021-01-14 NOTE — PROGRESS NOTES
REASON FOR VISIT:    Juanis Samuel is a 41year old male who presents for an [address]. Has not had a seizure for over 15 years. Depression anxiety under control. No sexual activity at this time.     Patient Active Problem List:     LHPO for:  HCVAB   Tuberculosis Screen If high risk No components found for: PPDINDURAT       SPECIFIC DISEASE MONITORING Internal Lab or Procedure   No disease specific diagnoses    ALLERGIES:     Paroxetine              NAUSEA ONLY  CURRENT MEDICATIONS:   Curr 114/76       GENERAL: well developed, well nourished, in no apparent distress   SKIN: no rashes, no suspicious lesions  HEENT: atraumatic, normocephalic, ears and throat are clear  EYES:PERRLA, EOMI, conjunctiva are clear.     NECK: supple, no adenopathy, n INTERNAL

## 2021-11-30 ENCOUNTER — OFFICE VISIT (OUTPATIENT)
Dept: FAMILY MEDICINE CLINIC | Facility: CLINIC | Age: 46
End: 2021-11-30
Payer: COMMERCIAL

## 2021-11-30 VITALS
WEIGHT: 153 LBS | SYSTOLIC BLOOD PRESSURE: 111 MMHG | HEIGHT: 65 IN | DIASTOLIC BLOOD PRESSURE: 75 MMHG | HEART RATE: 79 BPM | BODY MASS INDEX: 25.49 KG/M2

## 2021-11-30 DIAGNOSIS — G40.802 OTHER EPILEPSY WITHOUT STATUS EPILEPTICUS, NOT INTRACTABLE (HCC): ICD-10-CM

## 2021-11-30 DIAGNOSIS — Z00.00 ROUTINE PHYSICAL EXAMINATION: Primary | ICD-10-CM

## 2021-11-30 DIAGNOSIS — F52.4 PREMATURE EJACULATION: ICD-10-CM

## 2021-11-30 DIAGNOSIS — Z12.11 ENCOUNTER FOR SCREENING COLONOSCOPY: ICD-10-CM

## 2021-11-30 PROCEDURE — 90471 IMMUNIZATION ADMIN: CPT | Performed by: FAMILY MEDICINE

## 2021-11-30 PROCEDURE — 3078F DIAST BP <80 MM HG: CPT | Performed by: FAMILY MEDICINE

## 2021-11-30 PROCEDURE — 99396 PREV VISIT EST AGE 40-64: CPT | Performed by: FAMILY MEDICINE

## 2021-11-30 PROCEDURE — 90686 IIV4 VACC NO PRSV 0.5 ML IM: CPT | Performed by: FAMILY MEDICINE

## 2021-11-30 PROCEDURE — 3074F SYST BP LT 130 MM HG: CPT | Performed by: FAMILY MEDICINE

## 2021-11-30 PROCEDURE — 3008F BODY MASS INDEX DOCD: CPT | Performed by: FAMILY MEDICINE

## 2021-11-30 RX ORDER — ESCITALOPRAM OXALATE 5 MG/1
5 TABLET ORAL DAILY
Qty: 90 TABLET | Refills: 1 | Status: SHIPPED | OUTPATIENT
Start: 2021-11-30 | End: 2022-01-24

## 2021-11-30 NOTE — PROGRESS NOTES
REASON FOR VISIT:    Malissa Tomas is a 40year old male who presents for an [address].         Patient Active Problem List:     Routine general medical examination at a health care facility     Seizure [facility])     Depression     Weight loss specific diagnoses    ALLERGIES:     Paroxetine              NAUSEA ONLY  CURRENT MEDICATIONS:   Current Outpatient Medications   Medication Sig Dispense Refill   • escitalopram 5 MG Oral Tab Take 1 tablet (5 mg total) by mouth daily.  90 tablet 1   • levET apparent distress   SKIN: no rashes, no suspicious lesions  HEENT: atraumatic, normocephalic, ears and throat are clear  EYES:PERRLA, EOMI, conjunctiva are clear.     NECK: supple, no adenopathy, no bruits  CHEST: no chest tenderness  LUNGS: clear to auscul intractable ([facility]  Referral to neurology  - NEURO - INTERNAL    4.  Premature ejaculation  Did not tolerate Paxil and would like to meet with urology  - UROLOGY - INTERNAL  Occasional depression nonsignificant denies anxiety

## 2021-11-30 NOTE — PATIENT INSTRUCTIONS
La colonoscopia  La colonoscopia es evert prueba que permite mirar el interior del aparato digestivo inferior (el colon y el recto). Algunas veces se puede mirar la última parte del intestino villeda llamada íleon. Ernesto la prueba, se puede extirpar Pancho Been prueba   La prueba generalmente se realiza en el hospital megan paciente ambulatorio o en evert clínica ambulatoria. Eso significa que puede volver a darby casa emily mismo día. El procedimiento lleva aproximadamente 30 minutos.  Ernesto emily tiempo:   · Le administ

## 2021-12-24 ENCOUNTER — LAB ENCOUNTER (OUTPATIENT)
Dept: LAB | Age: 46
End: 2021-12-24
Attending: FAMILY MEDICINE
Payer: COMMERCIAL

## 2021-12-24 DIAGNOSIS — Z00.00 ROUTINE PHYSICAL EXAMINATION: ICD-10-CM

## 2021-12-24 PROCEDURE — 36415 COLL VENOUS BLD VENIPUNCTURE: CPT

## 2021-12-24 PROCEDURE — 80061 LIPID PANEL: CPT

## 2021-12-24 PROCEDURE — 85025 COMPLETE CBC W/AUTO DIFF WBC: CPT

## 2021-12-24 PROCEDURE — 82947 ASSAY GLUCOSE BLOOD QUANT: CPT

## 2021-12-24 PROCEDURE — 84443 ASSAY THYROID STIM HORMONE: CPT

## 2021-12-28 ENCOUNTER — TELEPHONE (OUTPATIENT)
Dept: FAMILY MEDICINE CLINIC | Facility: CLINIC | Age: 46
End: 2021-12-28

## 2021-12-28 NOTE — TELEPHONE ENCOUNTER
With  Carol Mantle ID [id number] advised patient of Dr Kelly Dejesus note. Patient verbalized understanding.

## 2021-12-28 NOTE — TELEPHONE ENCOUNTER
No answer,Left message for patient to return phone call, see below        ----- Message from Elisabeth Candelario MD sent at 12/28/2021  3:15 PM CST -----  Results reviewed. Tests show no significant abnormalities. Please inform patient.

## 2022-01-24 ENCOUNTER — TELEPHONE (OUTPATIENT)
Dept: SURGERY | Facility: CLINIC | Age: 47
End: 2022-01-24

## 2022-01-24 NOTE — TELEPHONE ENCOUNTER
Called [facility] back at pharmacy. Clarified allergy as listed paroxetine was mild nausea and also mentioned in Dr. Anne Garcia plan from today and acknowledged as he sent in sertraline. Escitalopram  also advised to be DC'd by Dr. Chidi Clark Verbalized understandi

## 2022-01-24 NOTE — PROGRESS NOTES
SUBJECTIVE:  Dania Grady is a 40year old male who presents for a consultation at the request of, and a copy of this note will be sent to, Dr. Michael Yanez, for evaluation of  Premature ejaculation. He states that the problem is unchanged.  Symptoms in chest wall deformities or tenderness  ABDOMEN: abdomen is soft without significant tenderness, masses, organomegaly or guarding  GENITOURINARY:      Penis: no penile lesions or discharge. Meatus normal location and size.       Scrotum: normal in appearance

## 2022-01-24 NOTE — TELEPHONE ENCOUNTER
Spoke with patient, Cecile Will had a few cancellations today, and would like to see if patient can come in earlier. Assisted in rescheduling appointment. PT confirmed and verbalized understanding.      Future Appointments   Date Time Provider Department Cent

## 2022-02-07 ENCOUNTER — OFFICE VISIT (OUTPATIENT)
Dept: GASTROENTEROLOGY | Facility: CLINIC | Age: 47
End: 2022-02-07
Payer: COMMERCIAL

## 2022-02-07 ENCOUNTER — TELEPHONE (OUTPATIENT)
Dept: GASTROENTEROLOGY | Facility: CLINIC | Age: 47
End: 2022-02-07

## 2022-02-07 VITALS
DIASTOLIC BLOOD PRESSURE: 77 MMHG | HEART RATE: 71 BPM | WEIGHT: 158 LBS | HEIGHT: 65 IN | BODY MASS INDEX: 26.33 KG/M2 | SYSTOLIC BLOOD PRESSURE: 117 MMHG

## 2022-02-07 DIAGNOSIS — Z12.11 SCREENING FOR COLON CANCER: Primary | ICD-10-CM

## 2022-02-07 PROCEDURE — 3008F BODY MASS INDEX DOCD: CPT | Performed by: NURSE PRACTITIONER

## 2022-02-07 PROCEDURE — S0285 CNSLT BEFORE SCREEN COLONOSC: HCPCS | Performed by: NURSE PRACTITIONER

## 2022-02-07 PROCEDURE — 3078F DIAST BP <80 MM HG: CPT | Performed by: NURSE PRACTITIONER

## 2022-02-07 PROCEDURE — 3074F SYST BP LT 130 MM HG: CPT | Performed by: NURSE PRACTITIONER

## 2022-02-07 RX ORDER — POLYETHYLENE GLYCOL 3350, SODIUM CHLORIDE, SODIUM BICARBONATE, POTASSIUM CHLORIDE 420; 11.2; 5.72; 1.48 G/4L; G/4L; G/4L; G/4L
POWDER, FOR SOLUTION ORAL
Qty: 4000 ML | Refills: 0 | Status: SHIPPED | OUTPATIENT
Start: 2022-02-07

## 2022-02-07 NOTE — PATIENT INSTRUCTIONS
-Schedule colonoscopy w/Dr. Maureen Nguyen or Dr. Avinash Cain w/ MAC r/t medical hx  Dx: screening   -Eligible for NE: No r/t medical hx  -Prep: Split dose Colyte/TriLyte or equivalent  -Anti-platelets and anti-coagulants: none  -Diabetes meds: none    ** If MAC:    - HOLD ACE/ARBs the night before and/or the day of the procedure(s) - N/A   - NO alcohol, recreational drugs nor erectile dysfunction medications 24 hours before procedure(s)   - NO herbal supplements or weight loss medications (phentermine/Vyvanse/Adderall)  x 7 days prior to the procedure(s)    ** If MAC @ [facility] or IV twilit - continue all medications as prescribed    ** COVID-19 testing required 72 hours prior to procedure

## 2022-02-07 NOTE — TELEPHONE ENCOUNTER
Scheduled for:  Colonoscopy [address]  Provider Name:  Dr Alisson Enrique  Date:  Friday, 05/13/2022  Location:  [facility]  Sedation:  MAC  Time:  2:15pm ((pt is aware that [facility] will call the day before to confirm arrival time)  Prep:  Trilyte  Meds/Allergies Reconciled?:  RENETTA Prabhakar Reviewed  Diagnosis with codes:  Colon Screening Z12.11  Was patient informed to call insurance with codes (Y/N):  Yes  Referral sent?:  Referral was sent at the time of electronic surgical scheduling. [address] [place] [address] or [facility] notified?:  I sent an electronic request to Endo Scheduling and received a confirmation today. Medication Orders:  Pt is aware to NOT take iron pills, herbal meds and diet supplements for 7 days before exam. Also to NOT take any form of alcohol, recreational drugs and any forms of ED meds 24 hours before exam.   Misc Orders:  Patient was informed that they will need a COVID 19 test prior to their procedure. Patient verbally understood & will await a phone call from [facility] to schedule. Further instructions given by staff: I provide [place]ian prep instructions to patient at the time of the appointment and reviewed date, time and location, he verbalized that he understood and is aware to call if he has any questions.

## 2022-05-13 ENCOUNTER — SURGERY CENTER DOCUMENTATION (OUTPATIENT)
Dept: SURGERY | Age: 47
End: 2022-05-13

## 2022-05-13 ENCOUNTER — TELEPHONE (OUTPATIENT)
Dept: GASTROENTEROLOGY | Facility: CLINIC | Age: 47
End: 2022-05-13

## 2022-05-13 PROCEDURE — 45378 DIAGNOSTIC COLONOSCOPY: CPT | Performed by: INTERNAL MEDICINE

## 2022-05-13 NOTE — TELEPHONE ENCOUNTER
Recall colon in 10 years per Dr. Rakesh Mckeon. Last done: 5-13-22  Next due: 5-13-32    Updated health maintenance & pt outreach.

## 2022-05-13 NOTE — TELEPHONE ENCOUNTER
GI RNs: Please enter in health maintenance that a colonoscopy was performed and enter colonoscopy recall for 10 years.

## 2022-05-13 NOTE — PROCEDURES
[address] End[address] Report      Date of Procedure:  05/13/22      Preoperative Diagnosis:  Colorectal cancer screening      Postoperative Diagnosis:  Sigmoid colon diverticulosis      Procedure:    Screening colonoscopy      Surgeon:  Wen Anthony M.D. Anesthesia:  Monitored anesthesia care  Cecal withdrawal time: 17 minutes  EBL:  None      Brief History: This is a 40year old male who presents for a screening colonoscopy. He has had no lower gastrointestinal tract symptoms, signs or family history of colorectal cancer. Technique:  After informed consent, the patient was placed in the left lateral recumbent position. Digital rectal examination revealed no palpable intraluminal abnormalities. An Olympus variable stiffness 190 series HD colonoscope was inserted into the rectum and advanced under direct vision by following the lumen to the terminal ileum. The colon was examined upon withdrawal in the left lateral recumbent position. Findings:  The preparation of the colon was very good. The terminal ileum was examined for several cm and visually normal.  The ileocecal valve was well preserved. The visualized colonic mucosa from the cecum to the anal verge was normal with an intact vascular pattern. There  Was at least #1 diverticulum seen in the proximal sigmoid without current signs of complication. There were no colonic polyps, mass lesions, vascular anomalies or signs of inflammation seen. Retroflexion in the rectum revealed no abnormalities. The procedure was well tolerated without immediate complication. Impression:  1. Uncomplicated sigmoid coon diverticulosis  2. Otherwise normal screening colonoscopy to the terminal ileum. Recommendations:  1. High fiber diet  2. In the absence of any new symptoms or signs, repeat screening colonoscopy in 10 years.         Wen Anthony MD  5/13/2022

## 2022-06-27 RX ORDER — ESCITALOPRAM OXALATE 5 MG/1
TABLET ORAL
Qty: 90 TABLET | Refills: 1 | OUTPATIENT
Start: 2022-06-27

## 2022-07-20 NOTE — PATIENT INSTRUCTIONS
Refill policies:    • Allow 2 business days for refills; controlled substances may take longer. • Contact our office at least 5 days prior to running out of medication or submit request through the “request refill” option in your [employer] account.   • Ref have a procedure or additional testing performed. [facility]) will contact your insurance carrier to obtain pre-certification or prior authorization.     Unfortunately, [facility] has seen an increase in denial of payment even though the p A-T Advancement Flap Text: The defect edges were debeveled with a #15 scalpel blade.  Given the location of the defect, shape of the defect and the proximity to free margins an A-T advancement flap was deemed most appropriate.  Using a sterile surgical marker, an appropriate advancement flap was drawn incorporating the defect and placing the expected incisions within the relaxed skin tension lines where possible.    The area thus outlined was incised deep to adipose tissue with a #15 scalpel blade.  The skin margins were undermined to an appropriate distance in all directions utilizing iris scissors.

## 2022-10-01 ENCOUNTER — OFFICE VISIT (OUTPATIENT)
Dept: FAMILY MEDICINE CLINIC | Facility: CLINIC | Age: 47
End: 2022-10-01
Payer: COMMERCIAL

## 2022-10-01 VITALS
WEIGHT: 158 LBS | SYSTOLIC BLOOD PRESSURE: 115 MMHG | HEART RATE: 90 BPM | DIASTOLIC BLOOD PRESSURE: 72 MMHG | HEIGHT: 65 IN | BODY MASS INDEX: 26.33 KG/M2

## 2022-10-01 DIAGNOSIS — J02.9 SORE THROAT: Primary | ICD-10-CM

## 2022-10-01 LAB
CONTROL LINE PRESENT WITH A CLEAR BACKGROUND (YES/NO): YES YES/NO
KIT LOT #: 2554 NUMERIC
STREP GRP A CUL-SCR: NEGATIVE

## 2022-10-01 NOTE — PROGRESS NOTES
Blood pressure 115/72, pulse 90, height 5' 5\" (1.651 m), weight 158 lb (71.7 kg). Sore throat for the past 4 days. Mild dysphonia mild dysphagia some nasal congestion some cough. Mild diarrhea. No fever no dyspnea. No nocturnal cough. No asthma no smoking.     Objective throat erythematous no exudate rapid strep negative    Lungs clear to auscultation no rales rhonchi or wheezes    Assessment URI    Plan COVID swab sent home COVID was negative    Also ibuprofen for pain    Follow-up for results in 2 days

## 2022-10-02 LAB — SARS-COV-2 RNA RESP QL NAA+PROBE: DETECTED

## 2022-10-27 ENCOUNTER — TELEPHONE (OUTPATIENT)
Dept: FAMILY MEDICINE CLINIC | Facility: CLINIC | Age: 47
End: 2022-10-27

## 2022-10-27 NOTE — TELEPHONE ENCOUNTER
Patient is requesting referral.     Name of specialist and specialty department : [facility], unable to provide name of specialist  Reason for visit with the specialist: EEG and MRI  Address of the specialist office: [address] [facility] [address] [facility], [address] [place], [place] [address]  Appointment date: EEG 11/17 and MRI   order number: [id number]      CSS informed patient the turnaround time for referral is 5-7 business days. Patient was informed to check their [employer] account for referral status.

## 2022-10-28 NOTE — TELEPHONE ENCOUNTER
Hello     We would only obtain authorization for the office visit. We would need name of provider for a office visit. The office that is performing the procedure would obtain authorization for other service.      Thank you,  [facility]  Referral specialist

## 2022-11-18 NOTE — TELEPHONE ENCOUNTER
Patient calling to state doctor's name is Dr. Tyrel Moreno, located at [facility] in [facility]. Stated was recommended by Dr. Yessenia Kingston.

## 2023-01-07 ENCOUNTER — OFFICE VISIT (OUTPATIENT)
Dept: FAMILY MEDICINE CLINIC | Facility: CLINIC | Age: 48
End: 2023-01-07
Payer: COMMERCIAL

## 2023-01-07 VITALS
SYSTOLIC BLOOD PRESSURE: 102 MMHG | HEART RATE: 71 BPM | DIASTOLIC BLOOD PRESSURE: 67 MMHG | HEIGHT: 65 IN | WEIGHT: 160 LBS | BODY MASS INDEX: 26.66 KG/M2

## 2023-01-07 DIAGNOSIS — G40.802 OTHER EPILEPSY WITHOUT STATUS EPILEPTICUS, NOT INTRACTABLE (HCC): Primary | ICD-10-CM

## 2023-01-07 DIAGNOSIS — E78.1 HYPERTRIGLYCERIDEMIA: ICD-10-CM

## 2023-01-07 PROCEDURE — 3078F DIAST BP <80 MM HG: CPT | Performed by: FAMILY MEDICINE

## 2023-01-07 PROCEDURE — 99213 OFFICE O/P EST LOW 20 MIN: CPT | Performed by: FAMILY MEDICINE

## 2023-01-07 PROCEDURE — 3074F SYST BP LT 130 MM HG: CPT | Performed by: FAMILY MEDICINE

## 2023-01-07 PROCEDURE — 3008F BODY MASS INDEX DOCD: CPT | Performed by: FAMILY MEDICINE

## 2023-01-07 NOTE — PROGRESS NOTES
Blood pressure 102/67, pulse 71, height 5' 5\" (1.651 m), weight 160 lb (72.6 kg). Following up for seizure history. Patient has seen Dr. Kristina Mon at [facility] and EEG was ordered as well as MRI. Patient has not had a seizure for 18 years. Physician neurologist at [facility] advised patient that testing should be ordered by primary care. Patient reports that he currently feels well denies depression denies anxiety. Objective    Appropriate mood and affect    Assessment #1 history of epilepsy #2 hypertriglyceridemia    Plan #1 MRI order and EEG ordered both submitted for prior authorization patient advised to await authorization prior to scheduling procedures    2.   Fasting blood test ordered

## 2023-01-23 ENCOUNTER — LAB ENCOUNTER (OUTPATIENT)
Dept: LAB | Age: 48
End: 2023-01-23
Attending: FAMILY MEDICINE
Payer: COMMERCIAL

## 2023-01-23 DIAGNOSIS — E78.1 HYPERTRIGLYCERIDEMIA: ICD-10-CM

## 2023-01-23 LAB
ALBUMIN SERPL-MCNC: 3.6 G/DL (ref 3.4–5)
ALBUMIN/GLOB SERPL: 1 {RATIO} (ref 1–2)
ALP LIVER SERPL-CCNC: 70 U/L
ALT SERPL-CCNC: 80 U/L
ANION GAP SERPL CALC-SCNC: 3 MMOL/L (ref 0–18)
AST SERPL-CCNC: 32 U/L (ref 15–37)
BILIRUB SERPL-MCNC: 0.5 MG/DL (ref 0.1–2)
BUN BLD-MCNC: 15 MG/DL (ref 7–18)
BUN/CREAT SERPL: 16 (ref 10–20)
CALCIUM BLD-MCNC: 9.3 MG/DL (ref 8.5–10.1)
CHLORIDE SERPL-SCNC: 106 MMOL/L (ref 98–112)
CHOLEST SERPL-MCNC: 188 MG/DL (ref ?–200)
CO2 SERPL-SCNC: 32 MMOL/L (ref 21–32)
COMPLEXED PSA SERPL-MCNC: 0.83 NG/ML (ref ?–4)
CREAT BLD-MCNC: 0.94 MG/DL
FASTING PATIENT LIPID ANSWER: YES
FASTING STATUS PATIENT QL REPORTED: YES
GFR SERPLBLD BASED ON 1.73 SQ M-ARVRAT: 101 ML/MIN/1.73M2 (ref 60–?)
GLOBULIN PLAS-MCNC: 3.6 G/DL (ref 2.8–4.4)
GLUCOSE BLD-MCNC: 98 MG/DL (ref 70–99)
HDLC SERPL-MCNC: 30 MG/DL (ref 40–59)
LDLC SERPL CALC-MCNC: 104 MG/DL (ref ?–100)
NONHDLC SERPL-MCNC: 158 MG/DL (ref ?–130)
OSMOLALITY SERPL CALC.SUM OF ELEC: 293 MOSM/KG (ref 275–295)
POTASSIUM SERPL-SCNC: 4.8 MMOL/L (ref 3.5–5.1)
PROT SERPL-MCNC: 7.2 G/DL (ref 6.4–8.2)
SODIUM SERPL-SCNC: 141 MMOL/L (ref 136–145)
TRIGL SERPL-MCNC: 314 MG/DL (ref 30–149)
TSI SER-ACNC: 1.49 MIU/ML (ref 0.36–3.74)
VLDLC SERPL CALC-MCNC: 54 MG/DL (ref 0–30)

## 2023-01-23 PROCEDURE — 36415 COLL VENOUS BLD VENIPUNCTURE: CPT

## 2023-01-23 PROCEDURE — 80061 LIPID PANEL: CPT

## 2023-01-23 PROCEDURE — 80053 COMPREHEN METABOLIC PANEL: CPT

## 2023-01-23 PROCEDURE — 84443 ASSAY THYROID STIM HORMONE: CPT

## 2023-01-31 ENCOUNTER — HOSPITAL ENCOUNTER (OUTPATIENT)
Dept: MRI IMAGING | Age: 48
Discharge: HOME | End: 2023-01-31
Attending: FAMILY MEDICINE
Payer: COMMERCIAL

## 2023-01-31 DIAGNOSIS — G40.802 OTHER EPILEPSY WITHOUT STATUS EPILEPTICUS, NOT INTRACTABLE (HCC): ICD-10-CM

## 2023-01-31 PROCEDURE — 70551 MRI BRAIN STEM W/O DYE: CPT | Performed by: FAMILY MEDICINE

## 2023-02-06 ENCOUNTER — HOSPITAL ENCOUNTER (OUTPATIENT)
Dept: ELECTROPHYSIOLOGY | Facility: HOSPITAL | Age: 48
Discharge: HOME | End: 2023-02-06
Attending: FAMILY MEDICINE
Payer: COMMERCIAL

## 2023-02-06 DIAGNOSIS — G40.802 OTHER EPILEPSY WITHOUT STATUS EPILEPTICUS, NOT INTRACTABLE (HCC): ICD-10-CM

## 2023-02-06 PROCEDURE — 95816 EEG AWAKE AND DROWSY: CPT | Performed by: OTHER

## 2023-02-06 NOTE — PROCEDURES
EEG report    REFERRING PHYSICIAN: Diana Trivedi DO    PCP and phone number:  Jessica Loja. Venkatesh Ashford DO  [phone]    TECHNIQUE: 21 channels of EEG, 2 channels of EOG, and 1 channel of EKG were recorded utilizing the International 10/20 System. The recording was performed in a digitized monopolar referential format and playback was reformatted into various referential and bipolar montages utilizing appropriate filter settings. Automatic seizure and spike detection programs were utilized throughout the recording. Video was recorded during the study    CLINICAL DATA:  Patient is sent for the evaluation of possible seizures. MEDICATION:  Continuous Medications:      Scheduled Medications:  No current outpatient medications on file. PRN Medications:      ACTIVATION:  Hyperventilation: Not done  Photic stimulation: Done, no abnormalities  Sleep: Normal sleep architecture was seen. BACKGROUND  While the patient was awake, the posterior dominant rhythm consisted of well-regulated 9-10 Hz rhythmic waveforms, symmetrically distributed over both posterior quadrants and was reactive to eye opening. EEG ABNORMALITY  None    IMPRESSION:  This is a normal EEG. No focal, lateralized, or epileptiform features are noted. Clinical correlation required.

## 2023-03-10 ENCOUNTER — OFFICE VISIT (OUTPATIENT)
Dept: FAMILY MEDICINE CLINIC | Facility: CLINIC | Age: 48
End: 2023-03-10

## 2023-03-10 VITALS
SYSTOLIC BLOOD PRESSURE: 110 MMHG | HEIGHT: 65 IN | BODY MASS INDEX: 26.82 KG/M2 | HEART RATE: 63 BPM | DIASTOLIC BLOOD PRESSURE: 71 MMHG | WEIGHT: 161 LBS

## 2023-03-10 DIAGNOSIS — G40.802 OTHER EPILEPSY WITHOUT STATUS EPILEPTICUS, NOT INTRACTABLE (HCC): ICD-10-CM

## 2023-03-10 DIAGNOSIS — Z00.00 ROUTINE GENERAL MEDICAL EXAMINATION AT A HEALTH CARE FACILITY: Primary | ICD-10-CM

## 2023-03-10 DIAGNOSIS — F32.A DEPRESSION, UNSPECIFIED DEPRESSION TYPE: ICD-10-CM

## 2023-03-10 DIAGNOSIS — E78.1 HYPERTRIGLYCERIDEMIA: ICD-10-CM

## 2023-03-10 DIAGNOSIS — F41.9 ANXIETY: ICD-10-CM

## 2023-03-10 PROCEDURE — 99396 PREV VISIT EST AGE 40-64: CPT | Performed by: FAMILY MEDICINE

## 2023-03-10 PROCEDURE — 3008F BODY MASS INDEX DOCD: CPT | Performed by: FAMILY MEDICINE

## 2023-03-10 PROCEDURE — 3074F SYST BP LT 130 MM HG: CPT | Performed by: FAMILY MEDICINE

## 2023-03-10 PROCEDURE — 3078F DIAST BP <80 MM HG: CPT | Performed by: FAMILY MEDICINE

## 2023-03-31 ENCOUNTER — OFFICE VISIT (OUTPATIENT)
Dept: FAMILY MEDICINE CLINIC | Facility: CLINIC | Age: 48
End: 2023-03-31

## 2023-03-31 ENCOUNTER — LAB ENCOUNTER (OUTPATIENT)
Dept: LAB | Facility: REFERENCE LAB | Age: 48
End: 2023-03-31
Attending: PHYSICIAN ASSISTANT
Payer: COMMERCIAL

## 2023-03-31 VITALS
SYSTOLIC BLOOD PRESSURE: 130 MMHG | WEIGHT: 159 LBS | HEIGHT: 65 IN | HEART RATE: 82 BPM | BODY MASS INDEX: 26.49 KG/M2 | DIASTOLIC BLOOD PRESSURE: 80 MMHG

## 2023-03-31 DIAGNOSIS — N48.1 BALANITIS: ICD-10-CM

## 2023-03-31 DIAGNOSIS — R05.1 ACUTE COUGH: Primary | ICD-10-CM

## 2023-03-31 DIAGNOSIS — Z20.2 EXPOSURE TO SEXUALLY TRANSMITTED DISEASE (STD): ICD-10-CM

## 2023-03-31 DIAGNOSIS — E78.1 HYPERTRIGLYCERIDEMIA: ICD-10-CM

## 2023-03-31 LAB
ALT SERPL-CCNC: 36 U/L
AST SERPL-CCNC: 21 U/L (ref 15–37)
CHOLEST SERPL-MCNC: 165 MG/DL (ref ?–200)
FASTING PATIENT LIPID ANSWER: NO
HDLC SERPL-MCNC: 32 MG/DL (ref 40–59)
LDLC SERPL CALC-MCNC: 65 MG/DL (ref ?–100)
NONHDLC SERPL-MCNC: 133 MG/DL (ref ?–130)
TRIGL SERPL-MCNC: 439 MG/DL (ref 30–149)
VLDLC SERPL CALC-MCNC: 67 MG/DL (ref 0–30)

## 2023-03-31 PROCEDURE — 36415 COLL VENOUS BLD VENIPUNCTURE: CPT

## 2023-03-31 PROCEDURE — 87491 CHLMYD TRACH DNA AMP PROBE: CPT

## 2023-03-31 PROCEDURE — 84450 TRANSFERASE (AST) (SGOT): CPT

## 2023-03-31 PROCEDURE — 80061 LIPID PANEL: CPT

## 2023-03-31 PROCEDURE — 86696 HERPES SIMPLEX TYPE 2 TEST: CPT

## 2023-03-31 PROCEDURE — 87591 N.GONORRHOEAE DNA AMP PROB: CPT

## 2023-03-31 PROCEDURE — 84460 ALANINE AMINO (ALT) (SGPT): CPT

## 2023-03-31 PROCEDURE — 87389 HIV-1 AG W/HIV-1&-2 AB AG IA: CPT

## 2023-03-31 PROCEDURE — 86695 HERPES SIMPLEX TYPE 1 TEST: CPT

## 2023-03-31 PROCEDURE — 86780 TREPONEMA PALLIDUM: CPT

## 2023-03-31 RX ORDER — BENZONATATE 200 MG/1
200 CAPSULE ORAL 3 TIMES DAILY PRN
Qty: 30 CAPSULE | Refills: 0 | Status: SHIPPED | OUTPATIENT
Start: 2023-03-31

## 2023-03-31 RX ORDER — CLOTRIMAZOLE AND BETAMETHASONE DIPROPIONATE 10; .64 MG/G; MG/G
1 CREAM TOPICAL 2 TIMES DAILY PRN
Qty: 60 G | Refills: 0 | Status: SHIPPED | OUTPATIENT
Start: 2023-03-31

## 2023-04-03 LAB
C TRACH DNA SPEC QL NAA+PROBE: NEGATIVE
HSV 1 GLYCOPROTEIN G, IGG: POSITIVE
HSV 2 GLYCOPROTEIN G, IGG: NEGATIVE
N GONORRHOEA DNA SPEC QL NAA+PROBE: NEGATIVE
T PALLIDUM AB SER QL: NEGATIVE

## 2023-04-10 ENCOUNTER — TELEPHONE (OUTPATIENT)
Dept: FAMILY MEDICINE CLINIC | Facility: CLINIC | Age: 48
End: 2023-04-10

## 2023-04-10 NOTE — TELEPHONE ENCOUNTER
Patient is requesting the approved authorized referral be faxed to the following provider and mentions he had his appointment today, 4/10/23 and the referral was not received.      Dr. Ronda Posadas #[phone]  Phone # [phone]

## 2023-04-11 ENCOUNTER — MED REC SCAN ONLY (OUTPATIENT)
Dept: FAMILY MEDICINE CLINIC | Facility: CLINIC | Age: 48
End: 2023-04-11

## 2023-09-07 ENCOUNTER — HOSPITAL ENCOUNTER (OUTPATIENT)
Dept: GENERAL RADIOLOGY | Age: 48
Discharge: HOME | End: 2023-09-07
Attending: FAMILY MEDICINE
Payer: COMMERCIAL

## 2023-09-07 ENCOUNTER — OFFICE VISIT (OUTPATIENT)
Dept: FAMILY MEDICINE CLINIC | Facility: CLINIC | Age: 48
End: 2023-09-07

## 2023-09-07 VITALS
WEIGHT: 150 LBS | HEART RATE: 74 BPM | BODY MASS INDEX: 24.99 KG/M2 | SYSTOLIC BLOOD PRESSURE: 100 MMHG | HEIGHT: 65 IN | DIASTOLIC BLOOD PRESSURE: 65 MMHG

## 2023-09-07 DIAGNOSIS — M25.511 CHRONIC RIGHT SHOULDER PAIN: ICD-10-CM

## 2023-09-07 DIAGNOSIS — G89.29 CHRONIC RIGHT SHOULDER PAIN: ICD-10-CM

## 2023-09-07 DIAGNOSIS — M25.511 CHRONIC RIGHT SHOULDER PAIN: Primary | ICD-10-CM

## 2023-09-07 DIAGNOSIS — G89.29 CHRONIC RIGHT SHOULDER PAIN: Primary | ICD-10-CM

## 2023-09-07 PROCEDURE — 3078F DIAST BP <80 MM HG: CPT | Performed by: FAMILY MEDICINE

## 2023-09-07 PROCEDURE — 99213 OFFICE O/P EST LOW 20 MIN: CPT | Performed by: FAMILY MEDICINE

## 2023-09-07 PROCEDURE — 73030 X-RAY EXAM OF SHOULDER: CPT | Performed by: FAMILY MEDICINE

## 2023-09-07 PROCEDURE — 3008F BODY MASS INDEX DOCD: CPT | Performed by: FAMILY MEDICINE

## 2023-09-07 PROCEDURE — 3074F SYST BP LT 130 MM HG: CPT | Performed by: FAMILY MEDICINE

## 2023-09-07 RX ORDER — SERTRALINE HYDROCHLORIDE 25 MG/1
25 TABLET, FILM COATED ORAL DAILY
Qty: 90 TABLET | Refills: 3 | Status: SHIPPED | OUTPATIENT
Start: 2023-09-07

## 2023-09-07 NOTE — PROGRESS NOTES
Blood pressure 100/65, pulse 74, height 5' 5\" (1.651 m), weight 150 lb (68 kg). Patient presents today reporting that he has occasional shoulder discomfort with the right shoulder for the past 3 years. He cannot sleep on the right side. He reports otherwise it does not bother him. Also complains of premature ejaculation he had success previously using sertraline. Denies any depression.   Objective right shoulder with good range of motion    Abduction and adduction intact  Negative Hawkin sign negative impingement negative Darcie's test negative liftoff test negative bucket-handle test negative crossover test    Assessment #1 premature ejaculation #2 right shoulder discomfort    Plan #1 sertraline prescription #2 x-ray ordered    Patient does not want medication or physical therapy for the shoulder at this time will follow-up as needed

## 2023-09-12 ENCOUNTER — TELEPHONE (OUTPATIENT)
Dept: FAMILY MEDICINE CLINIC | Facility: CLINIC | Age: 48
End: 2023-09-12

## 2023-09-12 ENCOUNTER — HOSPITAL ENCOUNTER (OUTPATIENT)
Dept: CT IMAGING | Age: 48
Discharge: HOME | End: 2023-09-12
Attending: FAMILY MEDICINE

## 2023-09-12 DIAGNOSIS — E78.1 HYPERTRIGLYCERIDEMIA: ICD-10-CM

## 2023-09-12 RX ORDER — VENLAFAXINE 37.5 MG/1
37.5 TABLET ORAL DAILY
Qty: 30 TABLET | Refills: 1 | Status: SHIPPED | OUTPATIENT
Start: 2023-09-12

## 2023-10-20 ENCOUNTER — OFFICE VISIT (OUTPATIENT)
Dept: FAMILY MEDICINE CLINIC | Facility: CLINIC | Age: 48
End: 2023-10-20

## 2023-10-20 VITALS
DIASTOLIC BLOOD PRESSURE: 71 MMHG | HEIGHT: 65 IN | SYSTOLIC BLOOD PRESSURE: 103 MMHG | HEART RATE: 68 BPM | BODY MASS INDEX: 19.99 KG/M2 | WEIGHT: 120 LBS

## 2023-10-20 DIAGNOSIS — E78.1 HYPERTRIGLYCERIDEMIA: Primary | ICD-10-CM

## 2023-10-20 PROCEDURE — 3008F BODY MASS INDEX DOCD: CPT | Performed by: FAMILY MEDICINE

## 2023-10-20 PROCEDURE — 3074F SYST BP LT 130 MM HG: CPT | Performed by: FAMILY MEDICINE

## 2023-10-20 PROCEDURE — 99213 OFFICE O/P EST LOW 20 MIN: CPT | Performed by: FAMILY MEDICINE

## 2023-10-20 PROCEDURE — 3078F DIAST BP <80 MM HG: CPT | Performed by: FAMILY MEDICINE

## 2023-10-20 PROCEDURE — 90686 IIV4 VACC NO PRSV 0.5 ML IM: CPT | Performed by: FAMILY MEDICINE

## 2023-10-20 PROCEDURE — 90471 IMMUNIZATION ADMIN: CPT | Performed by: FAMILY MEDICINE

## 2023-10-20 RX ORDER — ROSUVASTATIN CALCIUM 10 MG/1
10 TABLET, COATED ORAL NIGHTLY
Qty: 90 TABLET | Refills: 1 | Status: SHIPPED | OUTPATIENT
Start: 2023-10-20

## 2023-10-20 RX ORDER — SERTRALINE HYDROCHLORIDE 25 MG/1
25 TABLET, FILM COATED ORAL DAILY
COMMUNITY

## 2023-10-20 NOTE — PROGRESS NOTES
Blood pressure 103/71, pulse 68, height 5' 5\" (1.651 m), weight 120 lb (54.4 kg). Following up for right shoulder pain AND premature ejaculation. Also has some bruising in the inside of his right thigh after playing soccer. He reports that he has noticed improvement using sertraline with the premature ejaculation he does not have any pain with his thigh unless he tries to play soccer. Objective    Minimal ecchymosis noted right thigh abductor satnam area    Assessment #1 ecchymosis likely muscle tear #2 premature ejaculation #3 right shoulder pain    4.   Hypertriglyceridemia   #1 encouraged to rest #2 continue sertraline #3 patient does not want medication, injection or physical therapy at this time #4 start rosuvastatin follow-up in 3 months    Flu shot today

## 2024-02-08 ENCOUNTER — OFFICE VISIT (OUTPATIENT)
Dept: FAMILY MEDICINE CLINIC | Facility: CLINIC | Age: 49
End: 2024-02-08

## 2024-02-08 VITALS
DIASTOLIC BLOOD PRESSURE: 79 MMHG | WEIGHT: 148.31 LBS | SYSTOLIC BLOOD PRESSURE: 112 MMHG | HEART RATE: 98 BPM | BODY MASS INDEX: 24.71 KG/M2 | HEIGHT: 65 IN

## 2024-02-08 DIAGNOSIS — R05.1 ACUTE COUGH: ICD-10-CM

## 2024-02-08 DIAGNOSIS — J02.9 SORE THROAT: Primary | ICD-10-CM

## 2024-02-08 LAB
CONTROL LINE PRESENT WITH A CLEAR BACKGROUND (YES/NO): YES YES/NO
KIT LOT #: 7282 NUMERIC
STREP GRP A CUL-SCR: YES

## 2024-02-08 PROCEDURE — 3078F DIAST BP <80 MM HG: CPT | Performed by: FAMILY MEDICINE

## 2024-02-08 PROCEDURE — 3074F SYST BP LT 130 MM HG: CPT | Performed by: FAMILY MEDICINE

## 2024-02-08 PROCEDURE — 3008F BODY MASS INDEX DOCD: CPT | Performed by: FAMILY MEDICINE

## 2024-02-08 PROCEDURE — 87880 STREP A ASSAY W/OPTIC: CPT | Performed by: FAMILY MEDICINE

## 2024-02-08 PROCEDURE — 99213 OFFICE O/P EST LOW 20 MIN: CPT | Performed by: FAMILY MEDICINE

## 2024-02-08 RX ORDER — PENICILLIN V POTASSIUM 500 MG/1
500 TABLET ORAL 3 TIMES DAILY
Qty: 30 TABLET | Refills: 0 | Status: SHIPPED | OUTPATIENT
Start: 2024-02-08

## 2024-02-08 RX ORDER — BENZONATATE 100 MG/1
100 CAPSULE ORAL 3 TIMES DAILY PRN
Qty: 45 CAPSULE | Refills: 0 | Status: SHIPPED | OUTPATIENT
Start: 2024-02-08

## 2024-02-08 NOTE — PROGRESS NOTES
Blood pressure 112/79, pulse 98, height 5' 5\" (1.651 m), weight 148 lb 4.8 oz (67.3 kg).          3-day history of cough yellow phlegm production also with a sore throat and headache.  Fever and chills.  No difficulty breathing.  No smoking no asthma.  COVID test negative at home.  NyQuil and DayQuil with minimal relief some facial pressure and tooth pain no bad breath.    Objective throat erythematous rapid strep positive    Lungs clear to auscultation no rales rhonchi or wheezes    Assessment strep throat    Plan penicillin benzonatate for cough follow-up if no improvement

## 2024-02-09 ENCOUNTER — TELEPHONE (OUTPATIENT)
Dept: FAMILY MEDICINE CLINIC | Facility: CLINIC | Age: 49
End: 2024-02-09

## 2024-02-09 LAB — SARS-COV-2 RNA RESP QL NAA+PROBE: NOT DETECTED

## 2024-02-09 NOTE — TELEPHONE ENCOUNTER
Patient notified of negative covid test.  He inquires on regaining mychart access.  Offered to transfer him to mychart help desk but he declines.

## 2024-02-23 ENCOUNTER — OFFICE VISIT (OUTPATIENT)
Dept: FAMILY MEDICINE CLINIC | Facility: CLINIC | Age: 49
End: 2024-02-23

## 2024-02-23 VITALS
DIASTOLIC BLOOD PRESSURE: 72 MMHG | HEIGHT: 65 IN | SYSTOLIC BLOOD PRESSURE: 110 MMHG | BODY MASS INDEX: 24.66 KG/M2 | HEART RATE: 83 BPM | WEIGHT: 148 LBS

## 2024-02-23 DIAGNOSIS — R05.1 ACUTE COUGH: ICD-10-CM

## 2024-02-23 DIAGNOSIS — J02.9 SORE THROAT: Primary | ICD-10-CM

## 2024-02-23 PROBLEM — N48.1 BALANITIS: Status: RESOLVED | Noted: 2018-04-27 | Resolved: 2024-02-23

## 2024-02-23 PROCEDURE — 87880 STREP A ASSAY W/OPTIC: CPT | Performed by: PHYSICIAN ASSISTANT

## 2024-02-23 PROCEDURE — 3074F SYST BP LT 130 MM HG: CPT | Performed by: PHYSICIAN ASSISTANT

## 2024-02-23 PROCEDURE — 99213 OFFICE O/P EST LOW 20 MIN: CPT | Performed by: PHYSICIAN ASSISTANT

## 2024-02-23 PROCEDURE — 3078F DIAST BP <80 MM HG: CPT | Performed by: PHYSICIAN ASSISTANT

## 2024-02-23 PROCEDURE — 3008F BODY MASS INDEX DOCD: CPT | Performed by: PHYSICIAN ASSISTANT

## 2024-02-23 RX ORDER — AMOXICILLIN 500 MG/1
500 CAPSULE ORAL 2 TIMES DAILY
Qty: 20 CAPSULE | Refills: 0 | Status: SHIPPED | OUTPATIENT
Start: 2024-02-23 | End: 2024-03-04

## 2024-02-23 NOTE — PROGRESS NOTES
HPI:     HPI  48 year-old male is here in the office complaining of cough, runny nose and sore throat for the past 3 days. Patient takes Tessalon on and off with relief. Patient states that patient tested positive for strep, however patient takes PCN for 5 days, then stopped taking it. The sore throat came back couple days ago.    Medications:     Current Outpatient Medications   Medication Sig Dispense Refill    amoxicillin 500 MG Oral Cap Take 1 capsule (500 mg total) by mouth 2 (two) times daily for 10 days. 20 capsule 0    benzonatate (TESSALON PERLES) 100 MG Oral Cap Take 1 capsule (100 mg total) by mouth 3 (three) times daily as needed. 45 capsule 0    sertraline 25 MG Oral Tab Take 1 tablet (25 mg total) by mouth daily.      rosuvastatin 10 MG Oral Tab Take 1 tablet (10 mg total) by mouth nightly. 90 tablet 1       Allergies:     Allergies   Allergen Reactions    Paroxetine NAUSEA ONLY       History:     Health Maintenance   Topic Date Due    COVID-19 Vaccine (4 - 2023-24 season) 09/01/2023    Annual Depression Screening  01/01/2024    Annual Physical  03/10/2024    DTaP,Tdap,and Td Vaccines (2 - Td or Tdap) 09/10/2025    Colorectal Cancer Screening  05/13/2032    Influenza Vaccine  Completed    Pneumococcal Vaccine: Birth to 64yrs  Aged Out       No LMP for male patient.   Past Medical History:     Past Medical History:   Diagnosis Date    Seizure disorder (HCC)     Seizures (HCC)        Past Surgical History:     Past Surgical History:   Procedure Laterality Date    Other surgical history  1997    LEFT ARM TUMOR NONCANCEROUS       Family History:     Family History   Problem Relation Age of Onset    Hypertension Father     Other (Other) Father 74        CVA    Diabetes Mother     Lipids Mother     Hypertension Mother        Social History:     Social History     Socioeconomic History    Marital status: Single     Spouse name: Not on file    Number of children: Not on file    Years of education: Not on file     Highest education level: Not on file   Occupational History    Not on file   Tobacco Use    Smoking status: Never    Smokeless tobacco: Never   Vaping Use    Vaping Use: Never used   Substance and Sexual Activity    Alcohol use: No     Alcohol/week: 0.0 standard drinks of alcohol    Drug use: No    Sexual activity: Not on file   Other Topics Concern     Service Not Asked    Blood Transfusions Not Asked    Caffeine Concern No     Comment: 1 cup coffee daily    Occupational Exposure Not Asked    Hobby Hazards Not Asked    Sleep Concern No    Stress Concern Not Asked    Weight Concern Not Asked    Special Diet Not Asked    Back Care Not Asked    Exercise Yes     Comment: soccer    Bike Helmet Not Asked    Seat Belt Not Asked    Self-Exams Not Asked   Social History Narrative    The patient does not use an assistive device..      The patient does live in a home with stairs.     Social Determinants of Health     Financial Resource Strain: Not on file   Food Insecurity: Not on file   Transportation Needs: Not on file   Physical Activity: Not on file   Stress: Not on file   Social Connections: Not on file   Housing Stability: Not on file       Review of Systems:   Review of Systems   Constitutional:  Negative for activity change, chills, fatigue and fever.   HENT:  Positive for rhinorrhea and sore throat. Negative for congestion, ear discharge, ear pain, postnasal drip, sinus pressure and sinus pain.    Respiratory:  Positive for cough. Negative for chest tightness, shortness of breath and wheezing.    Cardiovascular:  Negative for chest pain and palpitations.   Gastrointestinal:  Negative for abdominal distention, abdominal pain, blood in stool, constipation, diarrhea, nausea and vomiting.   Skin:  Negative for rash.        Vitals:    02/23/24 1511   BP: 110/72   Pulse: 83   Weight: 148 lb (67.1 kg)   Height: 5' 5\" (1.651 m)     Body mass index is 24.63 kg/m².    Physical Exam:   Physical Exam  Vitals reviewed.    Constitutional:       General: He is not in acute distress.     Appearance: He is well-developed.   HENT:      Head: Normocephalic and atraumatic.      Right Ear: External ear normal.      Left Ear: External ear normal.      Nose: Nose normal.      Mouth/Throat:      Mouth: Mucous membranes are moist.      Pharynx: Oropharynx is clear. Posterior oropharyngeal erythema present. No oropharyngeal exudate.   Eyes:      General:         Right eye: No discharge.         Left eye: No discharge.      Conjunctiva/sclera: Conjunctivae normal.   Cardiovascular:      Rate and Rhythm: Normal rate and regular rhythm.      Heart sounds: Normal heart sounds, S1 normal and S2 normal. No murmur heard.  Pulmonary:      Effort: Pulmonary effort is normal.      Breath sounds: Normal breath sounds. No wheezing or rales.   Chest:      Chest wall: No tenderness.   Lymphadenopathy:      Cervical: No cervical adenopathy.   Skin:     Findings: No rash.   Neurological:      Mental Status: He is alert and oriented to person, place, and time.   Psychiatric:         Behavior: Behavior is cooperative.          Assessment and Plan::     Problem List Items Addressed This Visit    None  Visit Diagnoses       Sore throat    -  Primary    Relevant Medications    amoxicillin 500 MG Oral Cap    Other Relevant Orders    POC Rapid Strep [14235] (Completed)  Supportive care includes:    encourage fluid intake   Advise patient to take Tylenol/Ibuprofen as needed for pain.  warm salt water gargles   humidifier       Acute cough        Relevant Medications    Continue with Tessalon 100 mg TID as needed.          Discussed plan of care with pt and pt is in agreement.All questions answered. Pt to call with questions or concerns.

## 2024-05-09 RX ORDER — ROSUVASTATIN CALCIUM 10 MG/1
10 TABLET, COATED ORAL NIGHTLY
Qty: 90 TABLET | Refills: 0 | Status: SHIPPED | OUTPATIENT
Start: 2024-05-09

## 2024-05-09 NOTE — TELEPHONE ENCOUNTER
Please review. Protocol Failed; No Protocol    Requested Prescriptions   Pending Prescriptions Disp Refills    ROSUVASTATIN 10 MG Oral Tab [Pharmacy Med Name: ROSUVASTATIN 10MG TABLETS] 90 tablet 1     Sig: TAKE 1 TABLET(10 MG) BY MOUTH EVERY NIGHT       Cholesterol Medication Protocol Failed - 5/7/2024  3:59 PM        Failed - ALT < 80     Lab Results   Component Value Date    ALT 36 03/31/2023             Failed - ALT resulted within past year        Failed - Lipid panel within past 12 months     Lab Results   Component Value Date    CHOLEST 165 03/31/2023    TRIG 439 (H) 03/31/2023    HDL 32 (L) 03/31/2023    LDL 65 03/31/2023    VLDL 67 (H) 03/31/2023    NONHDLC 133 (H) 03/31/2023             Passed - In person appointment or virtual visit in the past 12 mos or appointment in next 3 mos     Recent Outpatient Visits              2 months ago Sore throat    [facility], [address], [place] Avril Bazzi PA-C    Office Visit    3 months ago Sore throat    [facility], [place] Cespedes, David B, DO    Office Visit    6 [facility]     [facility]    8 months ago Chronic right shoulder pain    [facility], [place] Cespedes, David B, DO    Office Visit    1 year ago [facility]    [facility] Visit                               Recent Outpatient Visits              2 months ago Sore throat    [facility] [place]    Office Visit    3 months ago Sore throat    [facility], [place] Cespedes, David B, DO    Office Visit    6 months ago Hypertriglyceridemia    [facility], [place] Cespedes, David B,     Office Visit    8 months ago Chronic right  shoulder pain    [facility], [address], [place] Leroy Velasquez,     Office Visit    1 year ago Acute cough    [facility], [address], [place] Avril Bazzi PA-C    Office Visit

## 2024-08-23 ENCOUNTER — OFFICE VISIT (OUTPATIENT)
Dept: FAMILY MEDICINE CLINIC | Facility: CLINIC | Age: 49
End: 2024-08-23

## 2024-08-23 VITALS
HEIGHT: 65 IN | DIASTOLIC BLOOD PRESSURE: 62 MMHG | SYSTOLIC BLOOD PRESSURE: 99 MMHG | WEIGHT: 146 LBS | BODY MASS INDEX: 24.32 KG/M2 | HEART RATE: 79 BPM

## 2024-08-23 DIAGNOSIS — M25.511 CHRONIC RIGHT SHOULDER PAIN: Primary | ICD-10-CM

## 2024-08-23 DIAGNOSIS — G89.29 CHRONIC RIGHT SHOULDER PAIN: Primary | ICD-10-CM

## 2024-08-23 PROCEDURE — 99213 OFFICE O/P EST LOW 20 MIN: CPT | Performed by: FAMILY MEDICINE

## 2024-08-23 PROCEDURE — 3078F DIAST BP <80 MM HG: CPT | Performed by: FAMILY MEDICINE

## 2024-08-23 PROCEDURE — 3008F BODY MASS INDEX DOCD: CPT | Performed by: FAMILY MEDICINE

## 2024-08-23 PROCEDURE — 3074F SYST BP LT 130 MM HG: CPT | Performed by: FAMILY MEDICINE

## 2024-08-23 RX ORDER — MELOXICAM 15 MG/1
15 TABLET ORAL DAILY
Qty: 30 TABLET | Refills: 1 | Status: SHIPPED | OUTPATIENT
Start: 2024-08-23 | End: 2024-10-22

## 2024-08-23 NOTE — PROGRESS NOTES
Blood pressure 99/62, pulse 79, height 5' 5\" (1.651 m), weight 146 lb (66.2 kg).          Patient presents today complaining of chronic right shoulder pain for over a year.  He reports it wakes him from sleep when he rolls over on his shoulder.  He denies any history of dislocation.  No trauma.  Also with itchy bites from insects noted scattered on the body    Objective right shoulder with good range of motion in abduction and adduction    Negative apprehension test    There is tenderness at the right biceps tendon proximally also positive Darcie's test and tenderness with resisted external rotation negative bucket-handle test    Negative for impingement    Negative crossover  Scattered  erythematous lesions noted on the body  Assessment #1 rotator cuff injury right shoulder #2 insect bites likely oak mites    Plan #1 meloxicam prescription and physical therapy consider corticosteroid injection #2 Xyzal over-the-counter follow-up if no improvement

## 2024-09-06 ENCOUNTER — TELEPHONE (OUTPATIENT)
Dept: PHYSICAL THERAPY | Facility: HOSPITAL | Age: 49
End: 2024-09-06

## 2024-09-17 ENCOUNTER — TELEPHONE (OUTPATIENT)
Dept: PHYSICAL THERAPY | Facility: HOSPITAL | Age: 49
End: 2024-09-17

## 2024-09-18 ENCOUNTER — OFFICE VISIT (OUTPATIENT)
Dept: PHYSICAL THERAPY | Age: 49
End: 2024-09-18
Attending: FAMILY MEDICINE
Payer: COMMERCIAL

## 2024-09-18 DIAGNOSIS — G89.29 CHRONIC RIGHT SHOULDER PAIN: Primary | ICD-10-CM

## 2024-09-18 DIAGNOSIS — M25.511 CHRONIC RIGHT SHOULDER PAIN: Primary | ICD-10-CM

## 2024-09-18 PROCEDURE — 97110 THERAPEUTIC EXERCISES: CPT

## 2024-09-18 PROCEDURE — 97161 PT EVAL LOW COMPLEX 20 MIN: CPT

## 2024-09-18 NOTE — PROGRESS NOTES
PHYSICAL THERAPY EVALUATION:   Referring Physician: Dr. Velasquez  Date of Order 8/23/24     Date of Service: 9/18/2024   Diagnosis: Chronic right shoulder pain (M25.511,G89.29)     Patient verbally consented to be seen for PT evaluation and treatment    Precautions: none    Hang Swartz is a 49 year old male who presents to therapy today with complaints of : pain on the R shoulder when  he moves arm up sideways> forward .He also has pain   when he lifts something at shoulder level. He also has pain with  quick movements . Pain remains above shoulder level  but is constant    Pt describes pain level current 6/10, at best 0/10, at worst 9/10.     Current functional limitations include reported pain and  difficulty with :  worse when he sleeps and tries to change position, pain when raising arm above shoulder, Pt is R handed and trying to work with the L shoulder, pain with cleaning like circular movement , driving    History of current condition: pain started about 2 months, relates that he was lifting something heavy and felt something on the R shoulder, then started having pain once a week but 2 weeks ago he started having constant pain .  Pt states that he has been taking melaxicom, no effect   Previous episodes/treatments and results: no previous issues    Hang describes prior level of function pt states that he did not have issues prior to 3 mos ago, R handed. Pt states that he has no regular exercises but does play soccer and volleyball which he can't at this time    Social History/Occupation:     Pt goals include to be able to fix the problem.    Past medical history was reviewed with Hang. Significant findings include   Past Medical History:    Seizure disorder (HCC)    Seizures (HCC)        ASSESSMENT  Hang presents to physical therapy evaluation with primary c/o pain on the R shoulder when  he moves arm up sideways> forward .He also has pain   when he lifts something at shoulder  level. He also has pain with  quick movements . Pain remains above shoulder level  but is constant.  Hang reports functional deficits include but are not limited to worse when he sleeps and tries to change position, pain when raising arm above shoulder, Pt is R handed and trying to work with the L shoulder, pain with cleaning like circular movement , driving  .    The results of the objective tests and measures as noted below show  impairments of joint mobility, motor function, muscle performance, muscle endurance, range of motion, and coordination significantly affecting R shoulder and  upper quarter. Cervical  motions WFL in all planes.   Hang has noted  impaired posture.   Pt is positive for tenderness/hypertonicity  upon palpation on parascapular/paracervicals    Pt and PT discussed evaluation findings, pathology, POC and HEP.    Pt voiced understanding and performs HEP correctly without reported pain. Instructions were provided on how to modify as need or when to stop.    Skilled Physical Therapy is medically necessary to address the above impairments and reach functional goals to go back to prior level of function with minimal to no compensation, with improved ability to manage and decrease symptoms and pain when performing daily/work and community tasks.        OBJECTIVE:   Observation/Posture: rounded posture  Palpation: 2/4 on R bicipital groove area, increased tone parascapular    Cervical ROM : WFL in all planes, painfree      UE/Shoulder AROM/MMT:  ( *) denotes pain     RUE(ROM)   AROM/PROM LUE (ROM)    AROM/PROM RUE (MMT) LUE (MMT)   Flex (supine) 140 170 4+/5 5/5   Abd (supine) 130 180 4+/5 5/5   ER (supine) 80 at 90 abd 80 at 90abd 3+*/5 5/5   IR (supine) 70 at 90abd 90 at 90abd 4+/5 5/5   Extension    4+/5 5/5     Standing AROM:   Flexion: R:140-150 versus L:175  Abduction: R 78 to 120 versus L:165  IR behind back : R:T3 versus L:T3  Extension : R:60 versus L :60    Scapula MMT: RIGHT versus  LEFT  Midtraps  3/5 3/5   Rhomboids   3/5 3/5   Lower taps   NT/5 NT/5     Today’s Treatment and Response:         THERAEX X 25 mins  Discussed proper posture with scapula setting to assist with minimizing pain with UE usage  PROM in all planes within tolerance/parameters 2 x 10  Supine AROM: IR/ER at 80 abd   Pizza carry 2 x 10  Scapula retractions: narrow/extension YTB  2x10  Iso walk outs ER 5 secs x 10 the IR  5 secs x 10      Pt education was provided on exam findings, treatment diagnosis, treatment plan, expectations, and prognosis. Pt was also provided recommendations for possible soreness after evaluation and postural corrections.  Patient was instructed in and issued a HEP for: 1-4    Charges: PT Eval Low Complexity, theraex x 2      Total Timed Treatment: 25 min     Total Treatment Time: 45 min     Based on clinical rationale and outcome measures, this evaluation involved Low Complexity decision making due to 1-2 personal factors/comorbidities, 3 body structures involved/activity limitations, and evolving symptoms including changing pain levels.    PLAN OF CARE:       Goals:  To be achieved in  - 8 visits then will reassess  1. Pt will be I with HEP,its progression , demonstrating proper performance of exercises, >75% of the time to maintain progress in therapy   2. Pt will demonstrate improvement of R shoulder AROM  to equal or to be within 5-10 degrees of the contralateral side, painfree  for ease of affected daily tasks reported.  3 Pt will improve R  shoulder major muscles strength graded below 5/5 to at least half grade or better for ease of daily tasks   4. Pt will report decreased in pain and symptoms and functional limitations  by 50% or better to be able return to PLOF  5.Pt will demonstrate increased mid/low trap strength to half a grade or better for muscles graded below 5/5 to promote improved shoulder mechanics and stabilization with lifting and reaching     Frequency / Duration: Patient will  be seen for 1-2 x/week or a total of 8 visits over a 90 day period. Frequency may be changed as appropriate  Treatment plan includes:  Manual Therapy; Therapeutic Exercises; Neuromuscular Re-education; Therapeutic Activity; Gait Training; Patient education; Home exercise program instruction,self care home management,  Modalities as needed:Electrical stimulation (unattended) and Ultrasound    Education or treatment limitation: None  Rehab Potential:good      DASH :QuickDASH Outcome Score  Score: 34.09 % (9/18/2024  2:45 PM)         Patient/Family/Caregiver was advised of these findings, precautions, and treatment options and has agreed to actively participate in planning and for this course of care.    Thank you for your referral. Please co-sign or sign and return this letter via fax as soon as possible to [phone]. If you have any questions, please contact me at Dept: [phone]    Sincerely,  Electronically signed by therapist: Malini Harper,PT,DPT,CAPP- OB  Physician's certification required: Yes  I certify the need for these services furnished under this plan of treatment and while under my care.    X___________________________________________________ Date____________________    Certification From: 9/18/2024  To:12/17/2024

## 2024-09-24 ENCOUNTER — OFFICE VISIT (OUTPATIENT)
Dept: PHYSICAL THERAPY | Age: 49
End: 2024-09-24
Attending: FAMILY MEDICINE
Payer: COMMERCIAL

## 2024-09-24 PROCEDURE — 97110 THERAPEUTIC EXERCISES: CPT

## 2024-09-24 PROCEDURE — 97014 ELECTRIC STIMULATION THERAPY: CPT

## 2024-09-24 NOTE — PROGRESS NOTES
Dx: Chronic right shoulder pain (M25.511,G89.29)            Insurance   HMO           Authorized  # visits by insurance  :  5    Expiration date  of Authorization:12/31/24    Eval date/latest PN:9/18/24  Initial POC# of visits: 8                POC cert : 12/17/24    Authorizing Physician: Dr. Velasquez    Fall Risk: standard         Precautions:        none  Subjective: pt states that he feels the same, was only able to do HEP 2x  PAIN LEVEL:6/10  Assessment:   No change in AROM  PT continued with challenging pt in performing exercises in  different positions / amount of resistance applied/ within new ROM gains  in order to work towards goals  : concentric ER?IR and gravity resisted scaption and ER    PT did trial of IFC to see if will assist with decreasing pain to improve ability to progress with ROM/strengthening      See exercises as logged below       Objective:     RUE(ROM)   AROM/PROM LUE (ROM)     AROM/PROM RUE (MMT) LUE (MMT)    Flex (supine) 140 170 4+/5 5/5   Abd (supine) 130 180 4+/5 5/5   ER (supine) 80 at 90 abd 80 at 90abd 3+*/5 5/5   IR (supine) 70 at 90abd 90 at 90abd 4+/5 5/5   Extension      4+/5 5/5           Goals:   goals addressed this day as noted above  Goals:  To be achieved in  - 8 visits then will reassess  1. Pt will be I with HEP,its progression , demonstrating proper performance of exercises, >75% of the time to maintain progress in therapy   2. Pt will demonstrate improvement of R shoulder AROM  to equal or to be within 5-10 degrees of the contralateral side, painfree  for ease of affected daily tasks reported.  3 Pt will improve R  shoulder major muscles strength graded below 5/5 to at least half grade or better for ease of daily tasks   4. Pt will report decreased in pain and symptoms and functional limitations  by 50% or better to be able return to PLOF  5.Pt will demonstrate increased mid/low trap strength to half a grade or better for muscles graded below 5/5 to promote improved  shoulder mechanics and stabilization with lifting and reaching   Plan: cont per POC, assess IFC     Date: 9/24/24  TX#: 2/8 Date:              TX#: 3/ Date:        TX#: 4/ Date:               TX#: 5/   Date:   Tx#: 6/   Theraex: X 35  PROM In all planes ER/IR at 75 abd  Scapula clocks 2x10  SL ER 2x10  SL scaption 2x10  Scapula retraction narrow RTB 2 x 10  UBE level 0  x 6 mins         Manual:        Gait/NMRed:        Modalities iFC on R shoulder x15 mins with heat       HEP see patient instructions tab if with new HEP see patient instructions tab if with new HEP see patient instructions tab if with new HEP see patient instructions tab if with new HEP see patient instructions tab if with new HEP          Charges: theraex x2, IFC x1       Total Timed Treatment: 35 min  Total Treatment Time: 50 min

## 2024-09-25 NOTE — PROGRESS NOTES
Dx: Chronic right shoulder pain (M25.511,G89.29)            Insurance   HMO           Authorized  # visits by insurance  :  5    Expiration date  of Authorization:12/31/24    Eval date/latest PN:9/18/24  Initial POC# of visits: 8                POC cert : 12/17/24    Authorizing Physician: Dr. Velasquez    Fall Risk: standard         Precautions:        none  Subjective: Pt states that he has more pain . Pt states that he needed to take more meds. He has most difficulty with sleeping . He states that he has no pain with HEP but states that he has been lifting a lot:sideways at the job  PAIN LEVEL:7/10  Assessment:   PT held off IFC as this did not helped and had more pain.  PT did trial of MFR on parascapulars areas/teres to assist with more scapula mobility, less pain with shoulder elevation after  Will assess effect of MFR as pt reports that he did more at work and this will change this week  Discussed supporting supporting arm on pillow when sleeping  Improved shoulder  flexion/abduction  in supine      See exercises as logged below       Objective:     RUE(ROM)   AROM/PROM RUE (ROM)     AROM/PROM LUE (ROM) RUE (MMT) LUE MMT   Flex (supine) 170 170 4+/5 5/5   Abd (supine) 160 180 4+/5 5/5   ER (supine) 80 at 90 abd 80 at 90abd 3+*/5 5/5   IR (supine) 70 at 90abd 90 at 90abd 4+/5 5/5   Extension      4+/5 5/5           Goals:   goals addressed this day as noted above  Goals:  To be achieved in  - 8 visits then will reassess  1. Pt will be I with HEP,its progression , demonstrating proper performance of exercises, >75% of the time to maintain progress in therapy   2. Pt will demonstrate improvement of R shoulder AROM  to equal or to be within 5-10 degrees of the contralateral side, painfree  for ease of affected daily tasks reported.  3 Pt will improve R  shoulder major muscles strength graded below 5/5 to at least half grade or better for ease of daily tasks   4. Pt will report decreased in pain and symptoms and  functional limitations  by 50% or better to be able return to PLOF  5.Pt will demonstrate increased mid/low trap strength to half a grade or better for muscles graded below 5/5 to promote improved shoulder mechanics and stabilization with lifting and reaching   Plan: cont per POC, assess IFC     Date: 9/24/24  TX#: 2/8 Date:            9/27/24  TX#: 3/ Date:        TX#: 4/ Date:               TX#: 5/   Date:   Tx#: 6/   Theraex: X 35  PROM In all planes ER/IR at 75 abd  Scapula clocks 2x10  SL ER 2x10  SL scaption 2x10  Scapula retraction narrow RTB 2 x 10  UBE level 0  x 6 mins   X 25   PROM In all planes ER/IR at 90 abd  SL ER 2x10  SL scaption 2x10  Standing ER /IR YTB 2x10    UBE level 0  x 6 mins      Manual:  X 8 mins  MFR on parascapular area in supine and SL      Gait/NMRed:  X 12 mins  Prone rows x2x10  Prone T's 2x10  Prone I 2x10  SL scapula retraction/depression  Pinky on the wall 2x10      Modalities iFC on R shoulder x15 mins with heat       HEP see patient instructions tab if with new HEP see patient instructions tab if with new HEP  None new see patient instructions tab if with new HEP see patient instructions tab if with new HEP see patient instructions tab if with new HEP          Charges: theraex x2, man mob x1 ,neuro stella x1      Total Timed Treatment: 43min  Total Treatment Time:  45 min

## 2024-09-27 ENCOUNTER — OFFICE VISIT (OUTPATIENT)
Dept: PHYSICAL THERAPY | Age: 49
End: 2024-09-27
Attending: FAMILY MEDICINE
Payer: COMMERCIAL

## 2024-09-27 PROCEDURE — 97110 THERAPEUTIC EXERCISES: CPT

## 2024-09-27 PROCEDURE — 97112 NEUROMUSCULAR REEDUCATION: CPT

## 2024-09-27 PROCEDURE — 97140 MANUAL THERAPY 1/> REGIONS: CPT

## 2024-10-01 ENCOUNTER — TELEPHONE (OUTPATIENT)
Dept: PHYSICAL THERAPY | Age: 49
End: 2024-10-01

## 2024-10-02 ENCOUNTER — APPOINTMENT (OUTPATIENT)
Dept: PHYSICAL THERAPY | Age: 49
End: 2024-10-02
Attending: FAMILY MEDICINE
Payer: COMMERCIAL

## 2024-10-11 ENCOUNTER — APPOINTMENT (OUTPATIENT)
Dept: PHYSICAL THERAPY | Age: 49
End: 2024-10-11
Attending: FAMILY MEDICINE
Payer: COMMERCIAL

## 2024-10-16 ENCOUNTER — APPOINTMENT (OUTPATIENT)
Dept: PHYSICAL THERAPY | Age: 49
End: 2024-10-16
Attending: FAMILY MEDICINE
Payer: COMMERCIAL

## 2024-10-18 ENCOUNTER — OFFICE VISIT (OUTPATIENT)
Dept: FAMILY MEDICINE CLINIC | Facility: CLINIC | Age: 49
End: 2024-10-18

## 2024-10-18 VITALS
HEART RATE: 85 BPM | WEIGHT: 148 LBS | HEIGHT: 65 IN | BODY MASS INDEX: 24.66 KG/M2 | DIASTOLIC BLOOD PRESSURE: 67 MMHG | SYSTOLIC BLOOD PRESSURE: 111 MMHG

## 2024-10-18 DIAGNOSIS — G89.29 CHRONIC RIGHT SHOULDER PAIN: ICD-10-CM

## 2024-10-18 DIAGNOSIS — E78.1 HYPERTRIGLYCERIDEMIA: Primary | ICD-10-CM

## 2024-10-18 DIAGNOSIS — M25.511 CHRONIC RIGHT SHOULDER PAIN: ICD-10-CM

## 2024-10-18 PROCEDURE — 99213 OFFICE O/P EST LOW 20 MIN: CPT | Performed by: FAMILY MEDICINE

## 2024-10-18 PROCEDURE — 3078F DIAST BP <80 MM HG: CPT | Performed by: FAMILY MEDICINE

## 2024-10-18 PROCEDURE — 3008F BODY MASS INDEX DOCD: CPT | Performed by: FAMILY MEDICINE

## 2024-10-18 PROCEDURE — 3074F SYST BP LT 130 MM HG: CPT | Performed by: FAMILY MEDICINE

## 2024-10-18 RX ORDER — TRIAMCINOLONE ACETONIDE 40 MG/ML
40 INJECTION, SUSPENSION INTRA-ARTICULAR; INTRAMUSCULAR ONCE
Qty: 1 ML | Refills: 0 | Status: SHIPPED | OUTPATIENT
Start: 2024-10-18 | End: 2024-10-18

## 2024-10-18 NOTE — PROGRESS NOTES
Blood pressure 111/67, pulse 85, height 5' 5\" (1.651 m), weight 148 lb (67.1 kg).          Patient presents today complaining of right shoulder pain that persists.  He is a .  Has pain with moving his shoulder it hurts him to wake up in the morning.  No relief with physical therapy.    Objective    Comfortable no apparent distress    Back with positive Hernandez test positive Darcie's test good range of motion in abduction and adduction    Assessment arthritis of the right shoulder with rotator cuff dysfunction    Plan corticosteroid injection    Also fasting blood test ordered prior to physical

## 2024-10-21 ENCOUNTER — TELEPHONE (OUTPATIENT)
Dept: FAMILY MEDICINE CLINIC | Facility: CLINIC | Age: 49
End: 2024-10-21

## 2024-10-21 NOTE — TELEPHONE ENCOUNTER
Namita called, verified patient's Name and . States they received a prescription for triamcinolone acetonide on 10/18. She wants to clarify if it was sent by mistake. Relayed patient had an in-office corticosteroid injection that day. Verbalized understanding and had no further questions at this time. See below, from office visit note 10/18.    Pre-procedure Diagnoses   1. Chronic right shoulder pain [M25.511, G89.29]   Post-procedure Diagnoses   1. Chronic right shoulder pain [M25.511, G89.29]   Procedures   1. DRAIN/INJECT LARGE JOINT/BURSA [99771 (CPT®)]         Informed consent obtained all questions answered     Aseptic technique employed for corticosteroid injection of the right shoulder     Kenalog 40 mg/ML x 1 mL and lidocaine 1% x 4 mL     Patient tolerated well

## 2024-10-24 NOTE — PROGRESS NOTES
Dx: Chronic right shoulder pain (M25.511,G89.29)            Insurance   HMO           Authorized  # visits by insurance  :  5    Expiration date  of Authorization:12/31/24    Eval date/latest PN:9/18/24  Initial POC# of visits: 8                POC cert : 12/17/24    Authorizing Physician: Dr. Velasquez    Fall Risk: standard         Precautions:        none  Subjective: pt states that he had cortisone shot and feels 80% better, He relates that he has less pain in the morning after the shot and less pain in general. He relates that working  is better, still with pain when raising arm up. Pain is  worse 3-4/10 and stays for 2 mins  PAIN LEVEL:2/10  Assessment:     See exercises as logged below     AROM and MMT are improved    Advanced exercises in the clinic with standing and prone scapula exercises , working towards available range to address goals       Additional HEP given to continue with progress gained in therapy. Tactile,verbal and written cues given for proper performance. Pt voiced understanding and performs HEP which  correctly without reported pain.Instructions were provided on how to modify as need or when to stop.       See exercises as logged below     Objective:     RUE(ROM)   AROM/PROM RUE (ROM)     AROM/PROM LUE (ROM) RUE (MMT) LUE MMT   Flex (supine) 170 170 4+/5 5/5   Abd (supine) 170 180 4+/5 5/5   ER (supine) 80 at 90 abd 80 at 90abd 4//5 5/5   IR (supine) 70 at 90abd 90 at 90abd 4+/5 5/5   Extension      4+/5 5/5     Standing AROM:   Flexion: R:170 versus L:175  Abduction: R 165 versus L:165  IR behind back : R:T3 versus L:T3  Extension : R:60 versus L :60      Goals:   goals addressed this day as noted above  Goals:  To be achieved in  - 8 visits then will reassess  1. Pt will be I with HEP,its progression , demonstrating proper performance of exercises, >75% of the time to maintain progress in therapy :met  2. Pt will demonstrate improvement of R shoulder AROM  to equal or to be within 5-10  degrees of the contralateral side, painfree  for ease of affected daily tasks reported.:met after shot  3 Pt will improve R  shoulder major muscles strength graded below 5/5 to at least half grade or better for ease of daily tasks :improved abd  4. Pt will report decreased in pain and symptoms and functional limitations  by 50% or better to be able return to PLOF:better after shot  5.Pt will demonstrate increased mid/low trap strength to half a grade or better for muscles graded below 5/5 to promote improved shoulder mechanics and stabilization with lifting and reaching : on going    Plan: will assess next session and see how he is towards residual goals     Date: 9/24/24  TX#: 2/8 Date:            9/27/24  TX#: 3/ Date:      10/25/24  TX#: 45 Date:               TX#: 5/   Date:   Tx#: 6/   Theraex: X 35  PROM In all planes ER/IR at 75 abd  Scapula clocks 2x10  SL ER 2x10  SL scaption 2x10  Scapula retraction narrow RTB 2 x 10  UBE level 0  x 6 mins   X 25   PROM In all planes ER/IR at 90 abd  SL ER 2x10  SL scaption 2x10  Standing ER /IR YTB 2x10    UBE level 0  x 6 mins X25 mins     AROM in standing in all planes  Standing  flexion 2# 2x10 to shoulder\ level    Standing scaption 2# 2x10  Standing elbow flexion 2# 2x10  Standing ER /IR RTB 2x10  SL ER 1# 2x10  UBE level 0  x 6 mins       Manual:  X 8 mins  MFR on parascapular area in supine and SL      Gait/NMRed:  X 12 mins  Prone rows x2x10  Prone T's 2x10  Prone I 2x10  SL scapula retraction/depression  Pinky on the wall 2x10 X 15  Prone rows x2x10 x2#  Prone T's 2x10 x1#  Prone I 2x10 x2#  Pinky on the wall     Modalities iFC on R shoulder x15 mins with heat       HEP see patient instructions tab if with new HEP see patient instructions tab if with new HEP  None new see patient instructions tab if with new HEP    See tab see patient instructions tab if with new HEP see patient instructions tab if with new HEP          Charges: theraex x2, neuro stella x1      Total Timed Treatment: 43min  Total Treatment Time:  45 min

## 2024-10-25 ENCOUNTER — OFFICE VISIT (OUTPATIENT)
Dept: PHYSICAL THERAPY | Age: 49
End: 2024-10-25
Attending: FAMILY MEDICINE
Payer: COMMERCIAL

## 2024-10-25 PROCEDURE — 97110 THERAPEUTIC EXERCISES: CPT

## 2024-10-25 PROCEDURE — 97112 NEUROMUSCULAR REEDUCATION: CPT

## 2024-10-31 NOTE — PROGRESS NOTES
Sarah      Pt has attended 5 visits in Physical Therapy  .    Dx: Chronic right shoulder pain (M25.511,G89.29)            Insurance   HMO           Authorized  # visits by insurance  :  5    Expiration date  of Authorization:12/31/24    Eval date/latest PN:9/18/24  Initial POC# of visits: 8                POC cert : 12/17/24    Authorizing Physician: Dr. Velasquez    Fall Risk: standard         Precautions:        none  Subjective: pt states that he had cortisone shot and feels 80% better, He relates that he has less pain in the morning but pain at night at times: 5/10  x2-3 secs after moving    PAIN LEVEL: 3/10  Assessment:       Hang attended 5 visits for Physical Therapy.  He felt better after the shot     Hang reports being back to prior level of function/better  by 80% . Patient reports continued difficulty with above reported functions but pain no worse than 5/10 .    Hang is agreeable and feels that symptoms could be managed I at home with HEP.    Pt advised to follow up with MD if symptoms persist    Hang demonstrates better understanding and ability  to self correct upright posturing and how to minimize symptoms and pain with proper body mechanics/strategies       Overall improvement is noted on strength and  AROM  on affected areas of R UE  Hang now with no noted tenderness/hypertonicity on  R shoulder    Pt will be discharged from skilled PT at this time, being a good candidate to continue with HEP I. Pt voiced understanding and performs HEP   correctly without reported pain.Instructions were provided on how to modify as need or when to stop.     Objectives and goals as noted below.   Overall functional outcomes measures are noted to be improved. Quick dash remains the same after 5 visits, no change    See exercises as logged below     Objective:     RUE(ROM)   AROM/PROM RUE (ROM)     AROM/PROM LUE (ROM) RUE (MMT) LUE MMT   Flex (supine) 170 170 4+/5 5/5   Abd (supine) 170 180  4+/5 5/5   ER (supine) 80 at 90 abd 80 at 90abd 4//5 5/5   IR (supine) 70 at 90abd 90 at 90abd 4+/5 5/5   Extension      4+/5 5/5     Standing AROM:   Flexion: R:170 versus L:175  Abduction: R 165 versus L:165  IR behind back : R:T3 versus L:T3  Extension : R:60 versus L :60    QuickDASH Outcome Score  Score: 34.09 % (9/18/2024  2:45 PM)    Post QuickDASH Outcome Score  Post Score: 34.09 % (11/1/2024 12:37 PM)    0 % improvement   Goals:   goals addressed this day as noted above  Goals:  To be achieved in  - 8 visits then will reassess  1. Pt will be I with HEP,its progression , demonstrating proper performance of exercises, >75% of the time to maintain progress in therapy :met  2. Pt will demonstrate improvement of R shoulder AROM  to equal or to be within 5-10 degrees of the contralateral side, painfree  for ease of affected daily tasks reported.:met after shot  3 Pt will improve R  shoulder major muscles strength graded below 5/5 to at least half grade or better for ease of daily tasks :improved abd  4. Pt will report decreased in pain and symptoms and functional limitations  by 50% or better to be able return to PLOF:better after shot  5.Pt will demonstrate increased mid/low trap strength to half a grade or better for muscles graded below 5/5 to promote improved shoulder mechanics and stabilization with lifting and reaching : on going    Plan:   Plan: D/C with continued compliance to HEP     Patient/Family/Caregiver was advised of these findings, precautions, and treatment options and has agreed to actively participate in planning and for this course of care.    Thank you for your referral. If you have any questions, please contact me at Dept: [phone].    Sincerely,  Electronically signed by therapist: Malini Harper,PT,DPT,CAPP-OB  Physician's certification required:  YES    Date: 9/24/24  TX#: 2/8 Date:            9/27/24  TX#: 3/ Date:      10/25/24  TX#: 45 Date:    11/1/24           TX#: 5/     Theraex:  X 35  PROM In all planes ER/IR at 75 abd  Scapula clocks 2x10  SL ER 2x10  SL scaption 2x10  Scapula retraction narrow RTB 2 x 10  UBE level 0  x 6 mins   X 25   PROM In all planes ER/IR at 90 abd  SL ER 2x10  SL scaption 2x10  Standing ER /IR YTB 2x10    UBE level 0  x 6 mins X25 mins     AROM in standing in all planes  Standing  flexion 2# 2x10 to shoulder\ level    Standing scaption 2# 2x10  Standing elbow flexion 2# 2x10  Standing ER /IR RTB 2x10  SL ER 1# 2x10  UBE level 0  x 6 mins X 15 mins  UBE level 3 x6 mins : forward and back  AROM in all planes  x10  SL  ER # 2x10   Manual:  X 8 mins  MFR on parascapular area in supine and SL            Gait/NMRed:  X 12 mins  Prone rows x2x10  Prone T's 2x10  Prone I 2x10  SL scapula retraction/depression  Pinky on the wall 2x10 X 15  Prone rows x2x10 x2#  Prone T's 2x10 x1#  Prone I 2x10 x2#  Pinky on the wall X 25  Prone rows x2x10 x2#  Prone T's 2x10 x1#  Prone I 2x10 x2#  Pinky on the wall  Wall push ups 2x10  D1/D2 flexion 1# 2x10  Ball overhead abduction for scapula setting 2x10   Ball around truck with arm movements for scapula   protraction/retraction 2x10  Chest taps in quadruped 2x10   Modalities iFC on R shoulder x15 mins with heat      HEP see patient instructions tab if with new HEP see patient instructions tab if with new HEP  None new see patient instructions tab if with new HEP    See tab see patient instructions tab if with new HEP          Charges: theraex x1, neuro stella x2     Total Timed Treatment: 40min  Total Treatment Time:  40 min

## 2024-11-01 ENCOUNTER — OFFICE VISIT (OUTPATIENT)
Dept: PHYSICAL THERAPY | Age: 49
End: 2024-11-01
Attending: FAMILY MEDICINE
Payer: COMMERCIAL

## 2024-11-01 PROCEDURE — 97112 NEUROMUSCULAR REEDUCATION: CPT

## 2024-11-01 PROCEDURE — 97110 THERAPEUTIC EXERCISES: CPT

## 2024-11-02 ENCOUNTER — LAB ENCOUNTER (OUTPATIENT)
Dept: LAB | Age: 49
End: 2024-11-02
Attending: FAMILY MEDICINE
Payer: COMMERCIAL

## 2024-11-02 DIAGNOSIS — E78.1 HYPERTRIGLYCERIDEMIA: ICD-10-CM

## 2024-11-02 LAB
ALT SERPL-CCNC: 11 U/L
ANION GAP SERPL CALC-SCNC: 6 MMOL/L (ref 0–18)
AST SERPL-CCNC: 15 U/L (ref ?–34)
BUN BLD-MCNC: 16 MG/DL (ref 9–23)
BUN/CREAT SERPL: 14.7 (ref 10–20)
CALCIUM BLD-MCNC: 10.1 MG/DL (ref 8.7–10.4)
CHLORIDE SERPL-SCNC: 107 MMOL/L (ref 98–112)
CHOLEST SERPL-MCNC: 163 MG/DL (ref ?–200)
CO2 SERPL-SCNC: 30 MMOL/L (ref 21–32)
COMPLEXED PSA SERPL-MCNC: 0.71 NG/ML (ref ?–4)
CREAT BLD-MCNC: 1.09 MG/DL
EGFRCR SERPLBLD CKD-EPI 2021: 84 ML/MIN/1.73M2 (ref 60–?)
FASTING PATIENT LIPID ANSWER: YES
FASTING STATUS PATIENT QL REPORTED: YES
GLUCOSE BLD-MCNC: 93 MG/DL (ref 70–99)
HDLC SERPL-MCNC: 46 MG/DL (ref 40–59)
LDLC SERPL CALC-MCNC: 101 MG/DL (ref ?–100)
NONHDLC SERPL-MCNC: 117 MG/DL (ref ?–130)
OSMOLALITY SERPL CALC.SUM OF ELEC: 297 MOSM/KG (ref 275–295)
POTASSIUM SERPL-SCNC: 5 MMOL/L (ref 3.5–5.1)
SODIUM SERPL-SCNC: 143 MMOL/L (ref 136–145)
TRIGL SERPL-MCNC: 82 MG/DL (ref 30–149)
TSI SER-ACNC: 2.24 UIU/ML (ref 0.55–4.78)
VLDLC SERPL CALC-MCNC: 14 MG/DL (ref 0–30)

## 2024-11-02 PROCEDURE — 80048 BASIC METABOLIC PNL TOTAL CA: CPT

## 2024-11-02 PROCEDURE — 36415 COLL VENOUS BLD VENIPUNCTURE: CPT

## 2024-11-02 PROCEDURE — 80061 LIPID PANEL: CPT

## 2024-11-02 PROCEDURE — 84460 ALANINE AMINO (ALT) (SGPT): CPT

## 2024-11-02 PROCEDURE — 84443 ASSAY THYROID STIM HORMONE: CPT

## 2024-11-02 PROCEDURE — 84450 TRANSFERASE (AST) (SGOT): CPT

## 2024-11-08 ENCOUNTER — OFFICE VISIT (OUTPATIENT)
Dept: FAMILY MEDICINE CLINIC | Facility: CLINIC | Age: 49
End: 2024-11-08

## 2024-11-08 ENCOUNTER — APPOINTMENT (OUTPATIENT)
Dept: PHYSICAL THERAPY | Age: 49
End: 2024-11-08
Attending: FAMILY MEDICINE
Payer: COMMERCIAL

## 2024-11-08 VITALS
DIASTOLIC BLOOD PRESSURE: 78 MMHG | WEIGHT: 146 LBS | BODY MASS INDEX: 24.32 KG/M2 | HEART RATE: 87 BPM | HEIGHT: 65 IN | SYSTOLIC BLOOD PRESSURE: 119 MMHG

## 2024-11-08 DIAGNOSIS — D22.30 CHANGE IN FACIAL MOLE: ICD-10-CM

## 2024-11-08 DIAGNOSIS — E78.1 HYPERTRIGLYCERIDEMIA: ICD-10-CM

## 2024-11-08 DIAGNOSIS — Z00.00 ROUTINE GENERAL MEDICAL EXAMINATION AT A HEALTH CARE FACILITY: Primary | ICD-10-CM

## 2024-11-08 DIAGNOSIS — F52.4 PREMATURE EJACULATION: ICD-10-CM

## 2024-11-08 PROCEDURE — 3008F BODY MASS INDEX DOCD: CPT | Performed by: FAMILY MEDICINE

## 2024-11-08 PROCEDURE — 99396 PREV VISIT EST AGE 40-64: CPT | Performed by: FAMILY MEDICINE

## 2024-11-08 PROCEDURE — 3074F SYST BP LT 130 MM HG: CPT | Performed by: FAMILY MEDICINE

## 2024-11-08 PROCEDURE — 3078F DIAST BP <80 MM HG: CPT | Performed by: FAMILY MEDICINE

## 2024-11-08 NOTE — PROGRESS NOTES
REASON FOR VISIT:    Hang Swartz is a 49 year old male who presents for an Annual Health Assessment        Patient Active Problem List   Diagnosis    Seizure (HCC)    Depression    Weight loss    Anxiety    Grief reaction    Lumbar strain    Nonintractable epilepsy without status epilepticus (HCC)     General Health     At any time do you feel concerned for the safety/well-being of yourself and/or your children, in your home or elsewhere?: No     CAGE:           Depression Screening (PHQ-2/PHQ-9):  Over the LAST 2 WEEKS             PREVENTATIVE SERVICES  INDICATIONS AND SCHEDULE Recommendation Internal Lab or Procedure   Colonoscopy Screen Every 10 years Health Maintenance   Topic Date Due    Colorectal Cancer Screening  05/13/2032      Flex Sigmoidoscopy Screen  Every 5 years No results found for this or any previous visit.   Fecal Occult Blood  Annually No results found for: \"FOB\", \"OCCULTSTOOL\"   Obesity Screening Screen all adults annually Body mass index is 24.3 kg/m².     Preventive Services for Which Recommendations Vary with Risk Recommendation Internal Lab or Procedure   Cholesterol Screening Recommended screening varies with age, risk and gender LDL Cholesterol (mg/dL)   Date Value   11/02/2024 101 (H)      Diabetes Screening  If history of high blood pressure or other  risk factors No results found for: \"A1C\"  Glucose (mg/dL)   Date Value   11/02/2024 93        Gonorrhea Screening If high risk No results found for: \"GONOCOCCUS\"   HIV Screening For all adults age 18-65, older adults at increased risk HIV Antigen Antibody Combo (no units)   Date Value   03/31/2023 Non-Reactive      Syphilis Screening Screen if pregnant or high risk No results found for: \"RPR\"   Hepatitis C Screening Screen pts at high risk plus screen one time for adults born 1945 - 1965 No results found for: \"HCVAB\"   Tuberculosis Screen If high risk No components found for: \"PPDINDURAT\"       SPECIFIC DISEASE MONITORING Internal Lab or  Procedure   No disease specific diagnoses    ALLERGIES:   Allergies[1]  CURRENT MEDICATIONS:   No current outpatient medications on file.      MEDICAL INFORMATION:   Past Medical History:    Seizure disorder (HCC)    Seizures (HCC)      Past Surgical History:   Procedure Laterality Date    Other surgical history  1997    LEFT ARM TUMOR NONCANCEROUS      Family History   Problem Relation Age of Onset    Hypertension Father     Other (Other) Father 74        CVA    Diabetes Mother     Lipids Mother     Hypertension Mother      NO FAMILY HISTORY OF PROSTATE OR COLON CANCER     SOCIAL HISTORY:   Social History     Socioeconomic History    Marital status: Single   Tobacco Use    Smoking status: Never    Smokeless tobacco: Never   Vaping Use    Vaping status: Never Used   Substance and Sexual Activity    Alcohol use: No     Alcohol/week: 0.0 standard drinks of alcohol    Drug use: No   Other Topics Concern    Caffeine Concern No     Comment: 1 cup coffee daily    Sleep Concern No    Exercise Yes     Comment: soccer   Social History Narrative    The patient does not use an assistive device..      The patient does live in a home with stairs.     Social Drivers of Health      [facility]    Housing Stability     Occ:  :       REVIEW OF SYSTEMS:   GENERAL: feels well otherwise  SKIN: denies any unusual skin lesions  EYES: denies blurred vision or double vision  HEENT: denies nasal congestion, sinus pain or ST  LUNGS: denies shortness of breath with exertion  CARDIOVASCULAR: denies chest pain on exertion  GI: denies abdominal pain, denies heartburn  : denies nocturia or changes in stream  MUSCULOSKELETAL: denies back pain  NEURO: denies headaches  PSYCHE: denies depression or anxiety  HEMATOLOGIC: denies hx of anemia  ENDOCRINE: denies thyroid history  ALL/ASTHMA: denies hx of allergy or asthma  NO BLOOD IN STOOL  EXAM:   /78   Pulse 87   Ht 5'  5\" (1.651 m)   Wt 146 lb (66.2 kg)   BMI 24.30 kg/m²   >   BP Readings from Last 3 Encounters:   11/08/24 119/78   10/18/24 111/67   08/23/24 99/62        GENERAL: well developed, well nourished, in no apparent distress   SKIN: no rashes, dark irregular lesion noted left nasal area  HEENT: atraumatic, normocephalic, ears and throat are clear  EYES:PERRLA, EOMI, conjunctiva are clear.    NECK: supple, no adenopathy, no bruits  CHEST: no chest tenderness  LUNGS: clear to auscultation  CARDIO: RRR without murmur  GI: good BS's, no masses, HSM or tenderness  : two descended testes, no masses, no hernia, no penile lesions  RECTAL: deferred  MUSCULOSKELETAL: back is not tender, FROM of the back  EXTREMITIES: no cyanosis, clubbing or edema  NEURO: Oriented times three, cranial nerves are intact, motor and sensory are grossly intact         ASSESSMENT AND OTHER RELEVANT CHRONIC CONDITIONS:   Hang Swartz is a 49 year old male who presents for an Annual Health Assessment.     PLAN SUMMARY:   Diagnoses and all orders for this visit:    Routine general medical examination at a health care facility    Change in facial mole  -     ENT Referral - [facility])    Premature ejaculation  -     Urology Referral - [facility])    Hypertriglyceridemia       The patient indicates understanding of these issues and agrees to the plan.  No follow-ups on file.  Exercise counseling perfomed    SUGGESTED VACCINATIONS - Influenza, Pneumococcal, Zoster, Tetanus, HPV   Influenza: No recommendations at this time  Pneumonia: No recommendations at this time  HPV: No recommendations at this time  Tdap: No recommendations at this time  Shingles:      Influenza Annually   Pneumococcal if high risk   Td/Tdap once then every 10 years   HPV Males 11-21   Zoster (Shingles) 60 and older: one dose   Varicella 2 doses if not immune   MMR 1-2 doses if born after 1955 and not immune         1. Routine general medical  examination at a health care facility      2. Change in facial mole  Advised patient to have it removed left nasal area  - ENT Referral - [facility])    3. Premature ejaculation    - Urology Referral - [facility])    4. Hypertriglyceridemia  Currently not taking meds         [1]   Allergies  Allergen Reactions    Paroxetine NAUSEA ONLY

## 2024-11-15 ENCOUNTER — OFFICE VISIT (OUTPATIENT)
Dept: OTOLARYNGOLOGY | Facility: CLINIC | Age: 49
End: 2024-11-15

## 2024-11-15 ENCOUNTER — APPOINTMENT (OUTPATIENT)
Dept: PHYSICAL THERAPY | Age: 49
End: 2024-11-15
Attending: FAMILY MEDICINE
Payer: COMMERCIAL

## 2024-11-15 DIAGNOSIS — L98.9 EXTERNAL NASAL LESION: Primary | ICD-10-CM

## 2024-11-15 PROCEDURE — 11310 SHAVE SKIN LESION 0.5 CM/<: CPT | Performed by: OTOLARYNGOLOGY

## 2024-11-15 PROCEDURE — 99203 OFFICE O/P NEW LOW 30 MIN: CPT | Performed by: OTOLARYNGOLOGY

## 2024-11-15 NOTE — PROGRESS NOTES
Hang Swartz is a 49 year old male.    Chief Complaint   Patient presents with    Moles     Facial mole         HISTORY OF PRESENT ILLNESS  He presents with a history of a dark mole of his left nasal sidewall.  Seen by his primary care physician who suspected this to be somewhat suspicious in appearance and sent him to me for further evaluation and management.  Sent by Dr. Velasquez for my opinion regarding possible excision of this lesion.      Social History     Socioeconomic History    Marital status: Single   Tobacco Use    Smoking status: Never    Smokeless tobacco: Never   Vaping Use    Vaping status: Never Used   Substance and Sexual Activity    Alcohol use: No     Alcohol/week: 0.0 standard drinks of alcohol    Drug use: No   Other Topics Concern    Caffeine Concern No     Comment: 1 cup coffee daily    Sleep Concern No    Exercise Yes     Comment: soccer       Family History   Problem Relation Age of Onset    Hypertension Father     Other (Other) Father 74        CVA    Diabetes Mother     Lipids Mother     Hypertension Mother        Past Medical History:    Seizure disorder (HCC)    Seizures (HCC)       Past Surgical History:   Procedure Laterality Date    Other surgical history  1997    LEFT ARM TUMOR NONCANCEROUS         REVIEW OF SYSTEMS    System Neg/Pos Details   Constitutional Negative Fatigue, fever and weight loss.   ENMT Negative Drooling.   Eyes Negative Blurred vision and vision changes.   Respiratory Negative Dyspnea and wheezing.   Cardio Negative Chest pain, irregular heartbeat/palpitations and syncope.   GI Negative Abdominal pain and diarrhea.   Endocrine Negative Cold intolerance and heat intolerance.   Neuro Negative Tremors.   Psych Negative Anxiety and depression.   Integumentary Negative Frequent skin infections, pigment change and rash.   Hema/Lymph Negative Easy bleeding and easy bruising.           PHYSICAL EXAM    There were no vitals taken for this visit.       Constitutional  Normal Overall appearance - Normal.   Psychiatric Normal Orientation - Oriented to time, place, person & situation. Appropriate mood and affect.   Neck Exam Normal Inspection - Normal. Palpation - Normal. Parotid gland - Normal. Thyroid gland - Normal.   Eyes Normal Conjunctiva - Right: Normal, Left: Normal. Pupil - Right: Normal, Left: Normal. Fundus - Right: Normal, Left: Normal.   Neurological Normal Memory - Normal. Cranial nerves - Cranial nerves II through XII grossly intact.   Head/Face Normal Facial features - Normal. Eyebrows - Normal. Skull - Normal.        Nasopharynx Normal External nose - Normal. Lips/teeth/gums - Normal. Tonsils - Normal. Oropharynx - Normal.   Ears Normal Inspection - Right: Normal, Left: Normal. Canal - Right: Normal, Left: Normal. TM - Right: Normal, Left: Normal.   Skin Normal Inspection -0.2 cm multicolored nevus of the left nasal sidewall        Lymph Detail Normal Submental. Submandibular. Anterior cervical. Posterior cervical. Supraclavicular.        Nose/Mouth/Throat Normal External nose - Normal. Lips/teeth/gums - Normal. Tonsils - Normal. Oropharynx - Normal.   Nose/Mouth/Throat Normal Nares - Right: Normal Left: Normal. Septum -Normal  Turbinates - Right: Normal, Left: Normal.     No current outpatient medications on file.  ASSESSMENT AND PLAN    1. External nasal lesion  Suspicious multicolored nevus of the left nasal sidewall.  Shave excision performed.  Wound care discussed and understood.  We will call him with the results of his biopsy.  He does understand that if malignancy is found he may require further treatment.  - Specimen to Pathology, Tissue; Future  - Specimen to Pathology, Tissue        This note was prepared using Dragon Medical voice recognition dictation software. As a result errors may occur. When identified these errors have been corrected. While every attempt is made to correct errors during dictation discrepancies may still exist    Surinder Junior,  MD    11/15/2024    2:58 PM

## 2024-11-18 ENCOUNTER — TELEPHONE (OUTPATIENT)
Dept: OTOLARYNGOLOGY | Facility: CLINIC | Age: 49
End: 2024-11-18

## 2024-11-18 NOTE — TELEPHONE ENCOUNTER
Patient states he saw his test results for his skin and states he does not understand results and asking for a call to discuss. Please call.     [place] interpretor required

## 2024-11-22 ENCOUNTER — APPOINTMENT (OUTPATIENT)
Dept: PHYSICAL THERAPY | Age: 49
End: 2024-11-22
Attending: FAMILY MEDICINE
Payer: COMMERCIAL

## 2024-11-29 ENCOUNTER — APPOINTMENT (OUTPATIENT)
Dept: PHYSICAL THERAPY | Age: 49
End: 2024-11-29
Attending: FAMILY MEDICINE
Payer: COMMERCIAL

## 2024-12-03 ENCOUNTER — APPOINTMENT (OUTPATIENT)
Dept: PHYSICAL THERAPY | Age: 49
End: 2024-12-03
Attending: FAMILY MEDICINE

## 2024-12-16 NOTE — PROGRESS NOTES
[facility]  Follow-Up Visit    HPI: Hang Swartz is a 49 year old male presents for a follow up visit. Patient was last seen on 1/24/2022 by Dr. Figueroa.  Language line  utilized for this visit.    INTERVAL HISTORY: Patient previously seen for premature ejaculation.  Reports problem is unchanged and not yet at goal.    He lasts about 2 to 3 minutes with vaginal intercourse.  Reports normal libido and sex drive.    He was started on sertraline by his PCP for depression and feels that this medication helps with his symptoms.  He does not take it daily and takes it about once a week or so.    He denies gross hematuria or dysuria.  Nonintentional weight loss or bone pain.    No family history of prostate cancer.  PSA from 11/2024 normal at 0.71 ng/mL.       Social history: He is  and has children.  No smoking or illicit drug use.  Social alcohol.  Works as a .    Reviewed past medical, surgical, family, and social history.  Reviewed med list and allergies.      REVIEW OF SYSTEMS:  Pertinent positives and negatives per HPI. A 10-point ROS was performed and is otherwise negative.       EXAM:  /82 (BP Location: Left arm, Patient Position: Sitting, Cuff Size: adult)   Pulse 70   Ht 5' 2\" (1.575 m)   Wt 146 lb (66.2 kg)   BMI 26.70 kg/m²     Physical Exam  Constitutional:       Appearance: He is well-developed.   HENT:      Head: Normocephalic.   Eyes:      General: No scleral icterus.  Cardiovascular:      Rate and Rhythm: Normal rate.   Pulmonary:      Effort: Pulmonary effort is normal.   Genitourinary:     Comments: Uncircumcised male phallus.  Mild phimosis.  Normal testes bilaterally.  Skin:     General: Skin is warm and dry.   Neurological:      Mental Status: He is alert and oriented to person, place, and time.   Psychiatric:         Mood and Affect: Mood normal.         Behavior: Behavior normal.       PATHOLOGY:  No results  found.      LABS:  See HPI.      IMAGING:  No results found.      UROLOGY PROCEDURE:  None performed today.      IMPRESSION:  49 year old male with premature ejaculation.    Discussed with patient at length.  Reviewed relevant anatomy and physiology.    Management options reviewed including topical anesthetic cream, SSRIs on a as needed or daily basis.    Benefits, risks, side effects and alternatives of treatment were discussed.    Discussed that data shows that paroxetine has a higher success rate compared to other SSRIs when it comes to premature ejaculation.  Previously reported nausea as a side effect with paroxetine however reports this was many years ago.  He is going to try it again.    All questions answered.      PLAN:  1.  Discontinue sertraline.    2.  Start paroxetine 20 mg daily for management of premature ejaculation and anxiety/depression.    Follow-up in 2 to 3 months.      Son Blair MD  12/16/2024

## 2025-02-05 ENCOUNTER — TELEPHONE (OUTPATIENT)
Dept: SURGERY | Facility: CLINIC | Age: 50
End: 2025-02-05

## 2025-02-20 NOTE — PROGRESS NOTES
[facility]  Follow-Up Visit    HPI: Hang Swartz is a 49 year old male presents for a follow up visit. Patient was last seen on 12/16/2024 by Dr Blair.      INTERVAL HISTORY: Patient previously seen for premature ejaculation.  Reports problem is unchanged and not yet at goal.    He lasts about 2 to 3 minutes with vaginal intercourse.  Reports normal libido and sex drive.    He was started on sertraline by his PCP for depression and feels that this medication helps with his symptoms.  He did not take it daily and took it about once a week or so.     At his previous visit, it was recommended that he discontinue Sertraline and begin taking Paroxetine 20 mg every day. He states that is gave him diarrhea after taking it for only a couple of days, so he stopped it and restarted the Sertraline 25 mg every day instead of PRN. He states that it has been helping.  Denies adverse side effects.    He denies gross hematuria or dysuria.  Nonintentional weight loss or bone pain.    No family history of prostate cancer.  PSA from 11/2024 normal at 0.71 ng/mL.       Social history: He is  and has children.  No smoking or illicit drug use.  Social alcohol.  Works as a .    Reviewed past medical, surgical, family, and social history.  Reviewed med list and allergies.      REVIEW OF SYSTEMS:  Pertinent positives and negatives per HPI. A 10-point ROS was performed and is otherwise negative.       EXAM:  There were no vitals taken for this visit.    Physical Exam  Constitutional:       Appearance: He is well-developed.   HENT:      Head: Normocephalic.      Mouth/Throat:      Mouth: Mucous membranes are moist.   Pulmonary:      Effort: Pulmonary effort is normal.   Abdominal:      General: Abdomen is flat.      Palpations: Abdomen is soft.   Skin:     General: Skin is warm and dry.   Neurological:      Mental Status: He is alert and oriented to person, place, and time.   Psychiatric:          Mood and Affect: Mood normal.         Behavior: Behavior normal.       PATHOLOGY:  No results found.      LABS:  See HPI.      IMAGING:  No results found.      UROLOGY PROCEDURE:  None performed today.      IMPRESSION:  49 year old male with premature ejaculation.    Discussed with patient at length.  Reviewed relevant anatomy and physiology.    Management options reviewed including topical anesthetic cream, SSRIs on a as needed or daily basis.    Benefits, risks, side effects and alternatives of treatment were discussed.    Discussed at previous visit that data shows that paroxetine has a higher success rate compared to other SSRIs when it comes to premature ejaculation.  Previously reported nausea as a side effect with paroxetine however reports this was many years ago.  He developed diarrhea after taking it this time, so he switched back to Sertraline. He is currently tolerating it well with good results.    All questions answered.      PLAN:  1.  Continue sertraline 25 mg daily for management of premature ejaculation and anxiety/depression. Prescribed by PCP    Follow-up as needed      RENETTA Alegre  02/21/2025

## 2025-02-21 NOTE — TELEPHONE ENCOUNTER
Dr Velasquez,     Kayla Velez's office requesting referral for appointment today, 2/21/25      Pended referral please review diagnosis and sign off if you agree.    Thank you.  Shira Barajas  [facility]

## 2025-04-24 ENCOUNTER — HOSPITAL ENCOUNTER (OUTPATIENT)
Dept: GENERAL RADIOLOGY | Age: 50
Discharge: HOME | End: 2025-04-24
Attending: PHYSICIAN ASSISTANT
Payer: COMMERCIAL

## 2025-04-24 ENCOUNTER — OFFICE VISIT (OUTPATIENT)
Dept: FAMILY MEDICINE CLINIC | Facility: CLINIC | Age: 50
End: 2025-04-24

## 2025-04-24 VITALS
BODY MASS INDEX: 27.6 KG/M2 | SYSTOLIC BLOOD PRESSURE: 116 MMHG | DIASTOLIC BLOOD PRESSURE: 76 MMHG | HEIGHT: 62 IN | WEIGHT: 150 LBS | HEART RATE: 73 BPM

## 2025-04-24 DIAGNOSIS — M25.562 ACUTE PAIN OF LEFT KNEE: ICD-10-CM

## 2025-04-24 DIAGNOSIS — M25.562 ACUTE PAIN OF LEFT KNEE: Primary | ICD-10-CM

## 2025-04-24 PROBLEM — G40.909 NONINTRACTABLE EPILEPSY WITHOUT STATUS EPILEPTICUS (HCC): Status: RESOLVED | Noted: 2017-01-11 | Resolved: 2025-04-24

## 2025-04-24 PROCEDURE — 3008F BODY MASS INDEX DOCD: CPT | Performed by: PHYSICIAN ASSISTANT

## 2025-04-24 PROCEDURE — 3078F DIAST BP <80 MM HG: CPT | Performed by: PHYSICIAN ASSISTANT

## 2025-04-24 PROCEDURE — 3074F SYST BP LT 130 MM HG: CPT | Performed by: PHYSICIAN ASSISTANT

## 2025-04-24 PROCEDURE — 73564 X-RAY EXAM KNEE 4 OR MORE: CPT | Performed by: PHYSICIAN ASSISTANT

## 2025-04-24 PROCEDURE — 99213 OFFICE O/P EST LOW 20 MIN: CPT | Performed by: PHYSICIAN ASSISTANT

## 2025-04-24 NOTE — PROGRESS NOTES
HPI:     HPI  A 49-year-old man presents with left knee pain since yesterday after playing soccer. He states that he had a seizure-free 20 years ago.  The knee pain is localized. He denies swelling, redness, or weakness.      Medications:   Current Medications[1]    Allergies:   Allergies[2]    History:     Health Maintenance   Topic Date Due    COVID-19 Vaccine (4 - 2024-25 season) 09/01/2024    Annual Depression Screening  01/01/2025    DTaP,Tdap,and Td Vaccines (2 - Td or Tdap) 09/10/2025    Annual Physical  11/08/2025    Colorectal Cancer Screening  05/13/2032    Influenza Vaccine  Completed    Pneumococcal Vaccine: Birth to 50yrs  Aged Out    Meningococcal B Vaccine  Aged Out       No LMP for male patient.   Past Medical History:   Past Medical History[3]    Past Surgical History:   Past Surgical History[4]    Family History:   Family History[5]    Social History:     Social History     Socioeconomic History    Marital status: Single     Spouse name: Not on file    Number of children: Not on file    Years of education: Not on file    Highest education level: Not on file   Occupational History    Not on file   Tobacco Use    Smoking status: Never    Smokeless tobacco: Never   Vaping Use    Vaping status: Never Used   Substance and Sexual Activity    Alcohol use: No     Alcohol/week: 0.0 standard drinks of alcohol    Drug use: No    Sexual activity: Not on file   Other Topics Concern     Service Not Asked    Blood Transfusions Not Asked    Caffeine Concern No     Comment: 1 cup coffee daily    Occupational Exposure Not Asked    Hobby Hazards Not Asked    Sleep Concern No    Stress Concern Not Asked    Weight Concern Not Asked    Special Diet Not Asked    Back Care Not Asked    Exercise Yes     Comment: soccer    Bike Helmet Not Asked    Seat Belt Not Asked    Self-Exams Not Asked   Social History Narrative    The patient does not use an assistive device..      The patient does live in a home with stairs.      Social Drivers of Health     Food Insecurity: Not on file   Transportation Needs: Not on file   Stress: Not on file   Housing Stability: Low Risk  (12/29/2021)    Received from [facility]    Housing Stability     Mortgage Payment Concerns?: Not on file     Number of Places Lived in the Last Year: Not on file     Unstable Housing?: Not on file       Review of Systems:   Review of Systems   + left knee pain    Vitals:    04/24/25 1020   BP: 116/76   Pulse: 73   Weight: 150 lb (68 kg)   Height: 5' 2\" (1.575 m)     Body mass index is 27.44 kg/m².    Physical Exam:   Physical Exam  Vitals reviewed.      Bilateral knee: No bony deformities, inflammation, or tenderness in bony prominences or soft tissue. No Baker's cyst. Full ROM (extension and flexion). No effusion sign.        Assessment and Plan::     Assessment & Plan  Acute pain of left knee    Orders:    XR KNEE, COMPLETE (4 OR MORE VIEWS), LEFT (CPT=73564); Future    Physical Therapy Referral - [facility]  Advise patient to take Tylenol or Ibuprofen as needed for pain.       Discussed plan of care with pt and pt is in agreement.All questions answered. Pt to call with questions or concerns.         [1]   Current Outpatient Medications   Medication Sig Dispense Refill    PARoxetine 20 MG Oral Tab Take 1 tablet (20 mg total) by mouth every morning. 30 tablet 3   [2]   Allergies  Allergen Reactions    Paroxetine NAUSEA ONLY   [3]   Past Medical History:   Lumbar strain    Seizure disorder (HCC)    Seizures (HCC)    Weight loss   [4]   Past Surgical History:  Procedure Laterality Date    Other surgical history  1997    LEFT ARM TUMOR NONCANCEROUS   [5]   Family History  Problem Relation Age of Onset    Hypertension Father     Other (Other) Father 74        CVA    Diabetes Mother     Lipids Mother     Hypertension Mother

## 2025-06-03 ENCOUNTER — OFFICE VISIT (OUTPATIENT)
Dept: FAMILY MEDICINE CLINIC | Facility: CLINIC | Age: 50
End: 2025-06-03

## 2025-06-03 ENCOUNTER — HOSPITAL ENCOUNTER (OUTPATIENT)
Dept: GENERAL RADIOLOGY | Age: 50
Discharge: HOME | End: 2025-06-03
Attending: FAMILY MEDICINE
Payer: COMMERCIAL

## 2025-06-03 VITALS
SYSTOLIC BLOOD PRESSURE: 120 MMHG | BODY MASS INDEX: 27.6 KG/M2 | WEIGHT: 150 LBS | HEART RATE: 71 BPM | DIASTOLIC BLOOD PRESSURE: 77 MMHG | HEIGHT: 62 IN

## 2025-06-03 DIAGNOSIS — M77.02 MEDIAL EPICONDYLITIS OF ELBOW, LEFT: ICD-10-CM

## 2025-06-03 DIAGNOSIS — M23.92 INTERNAL DERANGEMENT OF LEFT KNEE: Primary | ICD-10-CM

## 2025-06-03 DIAGNOSIS — F52.4 PREMATURE EJACULATION: ICD-10-CM

## 2025-06-03 PROCEDURE — 3078F DIAST BP <80 MM HG: CPT | Performed by: FAMILY MEDICINE

## 2025-06-03 PROCEDURE — 73080 X-RAY EXAM OF ELBOW: CPT | Performed by: FAMILY MEDICINE

## 2025-06-03 PROCEDURE — 3008F BODY MASS INDEX DOCD: CPT | Performed by: FAMILY MEDICINE

## 2025-06-03 PROCEDURE — 3074F SYST BP LT 130 MM HG: CPT | Performed by: FAMILY MEDICINE

## 2025-06-03 PROCEDURE — 99214 OFFICE O/P EST MOD 30 MIN: CPT | Performed by: FAMILY MEDICINE

## 2025-06-03 RX ORDER — PAROXETINE 20 MG/1
20 TABLET, FILM COATED ORAL EVERY MORNING
Qty: 90 TABLET | Refills: 3 | Status: SHIPPED | OUTPATIENT
Start: 2025-06-03 | End: 2025-06-06

## 2025-06-03 RX ORDER — MELOXICAM 15 MG/1
15 TABLET ORAL DAILY
Qty: 30 TABLET | Refills: 1 | Status: SHIPPED | OUTPATIENT
Start: 2025-06-03 | End: 2025-08-02

## 2025-06-03 NOTE — PROGRESS NOTES
Subjective:   Hang Swartz is a 49 year old male who presents for Knee Pain (Pt in for f/u on Left knee pain, pain is 3/10 today)       History/Other:   History of Present Illness  Hang Swartz is a 49 year old male who presents with left knee pain following a sports injury.    Five weeks ago, he twisted his left knee while playing soccer, hearing a 'pop' and experiencing significant pain and swelling the next day. The knee feels weak and painful, especially when bending, and he has not played soccer since due to pain and instability. He has not taken medications for the knee pain but started vitamins for inflammation. Occasional swelling occurs, with minimal discomfort on stairs. He fell at the time of the injury and feels the knee is weak and unstable.    He has a new onset of elbow pain, described as mild. There is a family history of arthritis, as his mother has the condition. He is concerned about potential disability from this pain and has not sought treatment yet.    He is taking medication for anxiety, which he wishes to continue. He denies suicidal thoughts, drug use, and reports sleeping and working well.   Chief Complaint Reviewed and Verified  Nursing Notes Reviewed and   Verified  Allergies Reviewed  Medications Reviewed         Tobacco:  He has never smoked tobacco.    Current Medications[1]           Review of Systems:  Pertinent items are noted in HPI.      Objective:   /77 (BP Location: Left arm, Patient Position: Sitting, Cuff Size: adult)   Pulse 71   Ht 5' 2\" (1.575 m)   Wt 150 lb (68 kg)   BMI 27.44 kg/m²  Estimated body mass index is 27.44 kg/m² as calculated from the following:    Height as of this encounter: 5' 2\" (1.575 m).    Weight as of this encounter: 150 lb (68 kg).  Results  RADIOLOGY  MRI of the left knee: Not specified (04/29/2025)     Physical Exam  MUSCULOSKELETAL: Knee normal on examination. Medial elbow tenderness.  Left knee negative lachmann neg nancy neg  apleys grind  Good stability     Tenderness left medial epicondyle with neg tinels     Assessment & Plan:   1. Internal derangement of left knee (Primary)  -     ORTHOPEDIC - INTERNAL  -     Cancel: MRI KNEE, LEFT (SMY=31930); Future; Expected date: 06/03/2025  2. Medial epicondylitis of elbow, left  -     XR ELBOW, COMPLETE (MIN 3 VIEWS), LEFT (CPT=73080); Future; Expected date: 06/03/2025  -     Occupational Therapy Referral - [facility]  3. Premature ejaculation  -     PARoxetine HCl; Take 1 tablet (20 mg total) by mouth every morning.  Dispense: 90 tablet; Refill: 3  Other orders  -     Meloxicam; Take 1 tablet (15 mg total) by mouth daily. With meals. (pain/inflammation).  Dispense: 30 tablet; Refill: 1    Assessment & Plan  Left Knee Injury  Sustained a left knee injury while playing soccer five weeks ago, with weakness, significant pain on bending, and a popping sensation suggesting possible ligamentous injury. No medication taken for pain or inflammation. Suspected significant injury may require surgical intervention. MRI recommended to assess damage.  - Refer to orthopedic specialist for further evaluation and management.  - Order MRI of the left knee to assess for ligamentous injury.  - Prescribe meloxicam, one tablet daily, for inflammation for at least 10-14 days, up to a maximum of six weeks.  - Advise against playing sports until further notice.    Medial Epicondylitis (Golfer's Elbow)  Pain in the left elbow consistent with medial epicondylitis, located on the medial side and exacerbated by certain movements. Concerned about potential disability. Reassured this is tendon-related, not arthritis. Occupational therapy recommended as effective treatment.  - Provide education on medial epicondylitis in Bulgarian.  - Recommend occupational therapy for the left elbow.  - Advise use of counterforce brace for elbow tendinopathy, available at pharmacies.  - Provide instructions on proper placement of the  brace.    Anxiety  Requests refill of medication for anxiety. Denies suicidal ideation, drug use, or issues with sleep or work performance. Agreed to continue current medication regimen.  - Prescribe anxiety medication for three months, to be filled at [facility].    Recording duration: 8 minutes        No follow-ups on file.        Leroy Velasquez DO, 6/3/2025, 11:10 AM             [1]   Current Outpatient Medications   Medication Sig Dispense Refill    Meloxicam 15 MG Oral Tab Take 1 tablet (15 mg total) by mouth daily. With meals. (pain/inflammation). 30 tablet 1    PARoxetine 20 MG Oral Tab Take 1 tablet (20 mg total) by mouth every morning. 90 tablet 3

## 2025-06-05 ENCOUNTER — TELEPHONE (OUTPATIENT)
Dept: NEUROLOGY | Facility: CLINIC | Age: 50
End: 2025-06-05

## 2025-06-05 NOTE — TELEPHONE ENCOUNTER
Left knee. Please advise for xrays   Future Appointments   Date Time Provider Department Center   6/16/2025  8:40 AM Amada Marr MD [facility]

## 2025-06-06 ENCOUNTER — TELEPHONE (OUTPATIENT)
Dept: FAMILY MEDICINE CLINIC | Facility: CLINIC | Age: 50
End: 2025-06-06

## 2025-06-06 RX ORDER — SERTRALINE HYDROCHLORIDE 25 MG/1
25 TABLET, FILM COATED ORAL DAILY
Qty: 90 TABLET | Refills: 3 | Status: SHIPPED | OUTPATIENT
Start: 2025-06-06

## 2025-06-06 NOTE — TELEPHONE ENCOUNTER
Patient stated that he saw Dr Velasquez on 6/3/2025 but states that doctor prescribed the wrong medication. States that the paroxetine did not help him in the past. Has been taking sertraline 25mg daily and needs a 90 days supply with refills sent to his pharmacy. Medication pended.

## 2025-06-10 NOTE — TELEPHONE ENCOUNTER
Candit message sent.       Pharmacy    [id number] [place] [facility] [address] [place] [facility] [address] [place] [address], [phone], [phone]      Disp Refills Start End    sertraline 25 MG Oral Tab 90 tablet 3 6/6/2025 --    Sig - Route: Take 1 tablet (25 mg total) by mouth daily. - Oral    Sent to pharmacy as: Sertraline HCl 25 MG Oral Tablet (Zoloft)    Notes to Pharmacy: CANCEL PAROXETINE RX    E-Prescribing Status: Receipt confirmed by pharmacy (6/6/2025  4:13 PM CDT)

## 2025-06-16 ENCOUNTER — OFFICE VISIT (OUTPATIENT)
Dept: ORTHOPEDICS CLINIC | Facility: CLINIC | Age: 50
End: 2025-06-16
Payer: COMMERCIAL

## 2025-06-16 VITALS — BODY MASS INDEX: 27.6 KG/M2 | HEIGHT: 62 IN | WEIGHT: 150 LBS

## 2025-06-16 DIAGNOSIS — M23.52 RECURRENT LEFT KNEE INSTABILITY: Primary | ICD-10-CM

## 2025-06-16 PROCEDURE — 3008F BODY MASS INDEX DOCD: CPT | Performed by: ORTHOPAEDIC SURGERY

## 2025-06-16 PROCEDURE — 99204 OFFICE O/P NEW MOD 45 MIN: CPT | Performed by: ORTHOPAEDIC SURGERY

## 2025-06-16 NOTE — PROGRESS NOTES
[facility] Orthopaedic New Consult           The following individual(s) verbally consented to be recorded using ambient AI listening technology and understand that they can each withdraw their consent to this listening technology at any point by asking the clinician to turn off or pause the recording:    Patient name: Hang Swartz  Additional names:  Leroy Swartz, son    Chief Complaint   Patient presents with    Knee Pain     LEFT KNEE  -Pain for 6 weeks after playing soccer       History of Present Illness  Hang Swartz is a 49 year old male who presents with left knee pain following a soccer injury. He is accompanied by his son, Leroy Swartz.    Approximately six weeks ago, while playing soccer, he experienced a non-contact injury to his left knee. He was turning when he felt a 'crack' in the knee and subsequently fell. He was able to walk with caution and assistance immediately after the incident. Later that night, while walking at home, his knee 'loosened up' and gave out again.    Since the injury, there has been no significant swelling, but he experiences occasional cracking when walking. He denies any pain while walking, but notes discomfort with certain movements. The pain is located posteriorly with hyperflexion and is more pronounced on the lateral side of the knee. There is also some pain on the medial side, but no pain in the front of the knee.    He works as a  and reports no issues performing his job duties. He is right leg dominant and primarily uses his right leg for kicking in soccer.    He has not had any previous knee injuries or surgeries. No current pain while walking, but occasional cracking in the knee. He experiences posterior lateral joint line pain and some medial pain, but no anterior knee pain.    Past Medical History[1]  Past Surgical History[2]  Current Medications[3]  Allergies[4]  Family History[5]  Social History     Occupational History    Not on file    Tobacco Use    Smoking status: Never    Smokeless tobacco: Never   Vaping Use    Vaping status: Never Used   Substance and Sexual Activity    Alcohol use: No     Alcohol/week: 0.0 standard drinks of alcohol    Drug use: No    Sexual activity: Not on file        ROS:  Complete ROS reviewed by me and non-contributory to the chief complaint except as mentioned above.    Physical Exam:    Ht 5' 2\" (1.575 m)   Wt 150 lb (68 kg)   BMI 27.44 kg/m²   Constitutional: Well developed, well nourished, pleasant 49 year old male presenting with his son  Psychological: NAD, alert and appropriate  Respiratory: Breathing comfortably on room air with RR of 10-14  Cardiac: Palpable distal pulses with pink warm extremities distally  On examination there is no apparent swelling or discoloration about the left knee.  There is significant posterior lateral and posterior medial joint line tenderness to palpation with a minimally painful Iván's and Steinmann's test.  Knee ligaments are stable on varus valgus stress with solid endpoints.  Lachman is 1-2+ positive on the left with a soft endpoint.  Posterior drawer is normal.  Range of motion testing demonstrates adequate full extension but discomfort on passive hyperflexion.  There is no popliteal tenderness, pain on Martina's test or distal edema about the foot and ankle.  Distal sensory to light touch, motor function and perfusion are intact.       Imaging Results: Multiple images left knee personally viewed, demonstrating no acute fracture or dislocation.  Joint spaces are relatively preserved with no late degenerative changes.      XR KNEE, COMPLETE (4 OR MORE VIEWS), LEFT (CPT=73564)  Result Date: 4/24/2025  CONCLUSION: Minimal degenerative changes within the left knee.    Dictated by (CST): Shivam Taylor MD on 4/24/2025 at 11:06 AM     Finalized by (CST): Shivam Taylor MD on 4/24/2025 at 11:07 AM              Assessment: Diagnoses and all orders for this visit:    Diagnoses  and all orders for this visit:    Recurrent left knee instability  -     MRI KNEE, LEFT (ZQE=03327); Future      Assessment & Plan  Knee instability suspected ACL injury  Suspected ACL injury in the left knee, occurring six weeks ago during a soccer game. He experienced a cracking sensation and subsequent instability. Examination reveals posterior lateral joint line pain and some medial pain, with a positive Lachman test. X-rays show no major arthritis or bone injury, but there is concern for ligamentous injury, particularly the ACL. An MRI is necessary to assess the soft tissue and ligamentous structures for a more detailed evaluation.  - Order MRI of the left knee to assess soft tissue and ligamentous structures.  - Advise against playing soccer, running, jumping, pivoting or heavy loading of this knee until further evaluation.  - Allow continuation of work as a .      Amada Marr MD, [facility]  Orthopaedic Surgery   Sports Medicine/Knee and Shoulder  [facility]  t: [phone]  f: [phone]             This document was partially prepared using Dragon Medical voice recognition software.  Although every attempt is made to correct errors during dictation, discrepancies may still exist.               [1]   Past Medical History:   Lumbar strain    Seizure disorder (HCC)    Seizures (HCC)    Weight loss   [2]   Past Surgical History:  Procedure Laterality Date    Other surgical history  1997    LEFT ARM TUMOR NONCANCEROUS   [3]   Current Outpatient Medications   Medication Sig Dispense Refill    sertraline 25 MG Oral Tab Take 1 tablet (25 mg total) by mouth daily. 90 tablet 3    Meloxicam 15 MG Oral Tab Take 1 tablet (15 mg total) by mouth daily. With meals. (pain/inflammation). 30 tablet 1   [4]   Allergies  Allergen Reactions    Paroxetine NAUSEA ONLY   [5]   Family History  Problem Relation Age of Onset    Hypertension Father     Other (Other) Father 74         CVA    Diabetes Mother     Lipids Mother     Hypertension Mother

## 2025-06-18 ENCOUNTER — HOSPITAL ENCOUNTER (OUTPATIENT)
Dept: MRI IMAGING | Facility: HOSPITAL | Age: 50
Discharge: HOME | End: 2025-06-18
Attending: ORTHOPAEDIC SURGERY
Payer: COMMERCIAL

## 2025-06-18 DIAGNOSIS — M23.52 RECURRENT LEFT KNEE INSTABILITY: ICD-10-CM

## 2025-06-18 PROCEDURE — 73721 MRI JNT OF LWR EXTRE W/O DYE: CPT | Performed by: ORTHOPAEDIC SURGERY

## 2025-06-26 ENCOUNTER — TELEPHONE (OUTPATIENT)
Facility: CLINIC | Age: 50
End: 2025-06-26

## 2025-06-26 NOTE — TELEPHONE ENCOUNTER
Is first available ok for MRI review or should patient be seen sooner?  Please advise.  Thank you!        6/20/25  MRI Left Knee    Impression   CONCLUSION:  1. Grade 1 sprain of the LCL.  2. Mild suprapatellar joint effusion.  3. Mild tricompartmental osteoarthritis.

## 2025-06-26 NOTE — TELEPHONE ENCOUNTER
Patient reached out and stated that he has completed his MRI.    Patient is requesting if his MRI review can be scheduled prior to first available in [place], 7/21.    Please advise.

## 2025-06-27 NOTE — TELEPHONE ENCOUNTER
Per Dr Marr, please schedule patient first available with wait list to review MRI results.  Thank you!

## 2025-07-01 NOTE — TELEPHONE ENCOUNTER
Scheduled for   Future Appointments   Date Time Provider Department Center   7/14/2025  8:20 AM Amada Marr MD [facility]  [facility] [place]

## 2025-07-14 ENCOUNTER — TELEPHONE (OUTPATIENT)
Dept: ORTHOPEDICS CLINIC | Facility: CLINIC | Age: 50
End: 2025-07-14

## 2025-07-14 ENCOUNTER — OFFICE VISIT (OUTPATIENT)
Dept: ORTHOPEDICS CLINIC | Facility: CLINIC | Age: 50
End: 2025-07-14
Payer: COMMERCIAL

## 2025-07-14 VITALS — BODY MASS INDEX: 27.6 KG/M2 | HEIGHT: 62 IN | WEIGHT: 150 LBS

## 2025-07-14 DIAGNOSIS — S83.512D RUPTURE OF ANTERIOR CRUCIATE LIGAMENT OF LEFT KNEE, SUBSEQUENT ENCOUNTER: Primary | ICD-10-CM

## 2025-07-14 PROCEDURE — 99214 OFFICE O/P EST MOD 30 MIN: CPT | Performed by: ORTHOPAEDIC SURGERY

## 2025-07-14 PROCEDURE — 3008F BODY MASS INDEX DOCD: CPT | Performed by: ORTHOPAEDIC SURGERY

## 2025-07-14 NOTE — PROGRESS NOTES
[facility] Orthopaedic New Consult    Entire encounter is facilitated by language line interpretation for Maori         The following individual(s) verbally consented to be recorded using ambient AI listening technology and understand that they can each withdraw their consent to this listening technology at any point by asking the clinician to turn off or pause the recording:    Patient name: Hang Swartz    Chief Complaint   Patient presents with    Follow - Up     LEFT KNEE MRI RESULTS          History of Present Illness  Hang Swartz is a 49 year old male who presents with knee pain and instability following a soccer injury.    Approximately two months ago, he injured his knee while playing soccer and felt a 'crack' at the time of the injury. Since then, he has experienced occasional popping sounds in the knee, though he denies any current pain. The popping is described as a small sound that does not cause discomfort.    He has not played soccer since the injury. Over the past month, his knee has been stable, but he occasionally hears a 'small pop'. He denies any significant instability or pain at present.    He works as a , which involves sitting, and reports that his work does not exacerbate his knee symptoms. He enjoys playing soccer and volleyball, which he has currently stopped due to his knee condition.        Past Medical History[1]  Past Surgical History[2]  Current Medications[3]  Allergies[4]  Family History[5]  Social History     Occupational History    Not on file   Tobacco Use    Smoking status: Never    Smokeless tobacco: Never   Vaping Use    Vaping status: Never Used   Substance and Sexual Activity    Alcohol use: No     Alcohol/week: 0.0 standard drinks of alcohol    Drug use: No    Sexual activity: Not on file        ROS:  Complete ROS reviewed by me and non-contributory to the chief complaint except as mentioned above.    Physical Exam:    Ht 5' 2\" (1.575 m)   Wt 150  lb (68 kg)   BMI 27.44 kg/m²   Constitutional: Well developed, well nourished, pleasant 49 year old male  Psychological: NAD, alert and appropriate  Respiratory: Breathing comfortably on room air with RR of 10-14  Cardiac: Palpable distal pulses with pink warm lower extremities  Left knee: Minimal residual swelling is noted.  There is normalizing range of motion 0 to 120 degrees of flexion with mild discomfort on passive hyperflexion.  Medial and lateral joint lines are nontender.  Collateral ligaments are solid in varus valgus stress with palpable endpoints.  He continues to have a 1-2+ Lachman with a soft endpoint.  This is in contrast to a 0 Lachman with a palpable endpoint on the right.     Imaging Results: Recent MRI left knee personally viewed and radiology report read.  Although the ACL is described to have mucoid degeneration, I suspect a complete rupture of the ACL based on his clinical exam and MRI findings.  The femoral attachment is attenuated and poorly visualized extending to the mid substance.  Tibial attachment appears intact.  Posterior lateral tibial plateau bone marrow edema suggests a pivot shift mechanism.    MRI KNEE, LEFT (QZU=[id number])  Result Date: 6/20/2025  CONCLUSION:  1. Grade 1 sprain of the LCL. 2. Mild suprapatellar joint effusion. 3. Mild tricompartmental osteoarthritis.    Dictated by (CST): Clifford Rutherford MD on 6/20/2025 at 11:54 AM     Finalized by (CST): Clifford Rutherford MD on 6/20/2025 at 12:07 PM             Assessment: Diagnoses and all orders for this visit:    Diagnoses and all orders for this visit:    Rupture of anterior cruciate ligament of left knee, subsequent encounter      Assessment & Plan  Left knee ACL tear  MRI indicates mucoid degeneration of the ACL, suggesting a possible tear but correlating with his physical exam it is clear to me that he has an ACL deficient knee.  Looking more closely at the MRI I suspect there is a clear disruption from the femoral origin.   Bone marrow edema also suggest apiculate shift mechanism of injury.  I had a long discussion with the patient regarding the implications of his injury.  Although he is clinically improving for activities of daily living, the knee is unstable for activities like soccer or volleyball.  We touched upon treatment options including conservative management versus surgical ACL reconstruction.  - Advise against playing soccer and running to prevent further knee instability and injury.  - Allow walking, swimming, and bicycling as safe activities.  - Discuss ACL reconstruction surgery if he wishes to return to sports such as soccer or volleyball.  - Explain that ACL reconstruction requires anesthesia and a recovery period of approximately one year before returning to unrestricted activities and sports.  - Provide information about ACL reconstruction surgery for consideration.  - Encourage discussion with family and employer for support and decision-making.  - Follow-up for more in-depth surgical discussion if he elects to proceed with reconstruction.    Amada Marr MD, [facility]  Orthopaedic Surgery   Sports Medicine/Knee [facility] Shoulder  [facility]  t: [phone]  f: [phone]           This document was partially prepared using Dragon Medical voice recognition software.  Although every attempt is made to correct errors during dictation, discrepancies may still exist.           [1]   Past Medical History:   Lumbar strain    Seizure disorder (HCC)    Seizures (HCC)    Weight loss   [2]   Past Surgical History:  Procedure Laterality Date    Other surgical history  1997    LEFT ARM TUMOR NONCANCEROUS   [3]   Current Outpatient Medications   Medication Sig Dispense Refill    sertraline 25 MG Oral Tab Take 1 tablet (25 mg total) by mouth daily. 90 tablet 3    Meloxicam 15 MG Oral Tab Take 1 tablet (15 mg total) by mouth daily. With meals. (pain/inflammation). 30 tablet 1   [4]    Allergies  Allergen Reactions    Paroxetine NAUSEA ONLY   [5]   Family History  Problem Relation Age of Onset    Hypertension Father     Other (Other) Father 74        CVA    Diabetes Mother     Lipids Mother     Hypertension Mother

## 2025-07-16 NOTE — TELEPHONE ENCOUNTER
Called and spoke with Hang using an  ([phone] [id number]) in regards to getting him scheduled for surgery. I did discuss optional surgical dates with him as he states that he needs to discuss this with his family and job before proceeding with scheduling surgery. States that he will give a call back into the office once he talks everything over with his family.

## 2025-08-25 ENCOUNTER — OFFICE VISIT (OUTPATIENT)
Dept: FAMILY MEDICINE CLINIC | Facility: CLINIC | Age: 50
End: 2025-08-25

## 2025-08-25 VITALS
HEIGHT: 62 IN | HEART RATE: 65 BPM | SYSTOLIC BLOOD PRESSURE: 122 MMHG | WEIGHT: 149.88 LBS | DIASTOLIC BLOOD PRESSURE: 75 MMHG | BODY MASS INDEX: 27.58 KG/M2

## 2025-08-25 DIAGNOSIS — M77.12 LATERAL EPICONDYLITIS OF LEFT ELBOW: Primary | ICD-10-CM

## 2025-08-25 PROCEDURE — 3074F SYST BP LT 130 MM HG: CPT | Performed by: FAMILY MEDICINE

## 2025-08-25 PROCEDURE — 3078F DIAST BP <80 MM HG: CPT | Performed by: FAMILY MEDICINE

## 2025-08-25 PROCEDURE — 99213 OFFICE O/P EST LOW 20 MIN: CPT | Performed by: FAMILY MEDICINE

## 2025-08-25 PROCEDURE — 3008F BODY MASS INDEX DOCD: CPT | Performed by: FAMILY MEDICINE

## (undated) DIAGNOSIS — Z12.11 COLON CANCER SCREENING: Primary | ICD-10-CM

## (undated) NOTE — LETTER
Leroy Velasquez Do  [address]  [address]  [place] [address]       11/15/24        Patient: Hang Swartz   YOB: 1975   Date of Visit: 11/15/2024       Dear  Dr. Ron DO,      Thank you for referring Hang Swartz to my practice.  Please find my assessment and plan below.        ASSESSMENT AND PLAN    1. External nasal lesion  Suspicious multicolored nevus of the left nasal sidewall.  Shave excision performed.  Wound care discussed and understood.  We will call him with the results of his biopsy.  He does understand that if malignancy is found he may require further treatment.  - Specimen to Pathology, Tissue; Future  - Specimen to Pathology, Tissue           Sincerely,   Surinder Junior MD   [address] [address]    Document electronically generated by:  Surinder Junior MD

## (undated) NOTE — LETTER
AUTHORIZATION FOR SURGICAL OPERATION OR OTHER PROCEDURE    1. I hereby authorize Dr. Surinder Junior , and [facility] staff assigned to my case to perform the following operation and/or procedure at the [facility] site:    _______________________________________________________________________________________________      _______________________________________________________________________________________________    2.  My physician has explained the nature and purpose of the operation or other procedure, possible alternative methods of treatment, the risks involved, and the possibility of complication to me.  I acknowledge that no guarantee has been made as to the result that may be obtained.  3.  I recognize that, during the course of this operation, or other procedure, unforseen conditions may necessitate additional or different procedure than those listed above.  I, therefore, further authorize and request that the above named physician, his/her physician assistants or designees perform such procedures as are, in his/her professional opinion, necessary and desirable.  4.  Any tissue or organs removed in the operation or other procedure may be disposed of by and at the discretion of the [facility] and [facility].  5.  I understand that in the event of a medical emergency, I will be transported by local paramedics to [facility] or other hospital emergency department.  6.  I certify that I have read and fully understand the above consent to operation and/or other procedure.    7.  I acknowledge that my physician has explained sedation/analgesia administration to me including the risks and benefits.  I consent to the administration of sedation/analgesia as may be necessary or desirable in the judgement of my physician.    Witness signature: ___________________________________________________ Date:  ______/______/_____                    Time:  ________  A.M.  P.M.       Patient Name:  ______________________________________________________  (please print)      Patient signature:  ___________________________________________________             Relationship to Patient:           []  Parent    Responsible person                          []  Spouse  In case of minor or                    [] Other  _____________   Incompetent name:  __________________________________________________                               (please print)      _____________      Responsible person  In case of minor or  Incompetent signature:  _______________________________________________    Statement of Physician  My signature below affirms that prior to the time of the procedure, I have explained to the patient and/or his/her guardian, the risks and benefits involved in the proposed treatment and any reasonable alternative to the proposed treatment.  I have also explained the risks and benefits involved in the refusal of the proposed treatment and have answered the patient's questions.                        Date:  ______/______/_______  Provider                      Signature:  __________________________________________________________       Time:  ___________ A.M    P.M.

## (undated) NOTE — MR AVS SNAPSHOT
[facility]  [address] [place] [address], [address] [place] [address]               Thank you for choosing us for your health care visit with Clarence Duverney. DO Ron.   We are glad to serve you and happy to provide you with this summary without status epilepticus, unspecified epilepsy type ([facility] [G40.909]           Glucose, Serum [E]    Complete by:  Jan 11, 2017 (Approximate)    Assoc Dx:  Routine physical examination [Z00.00], Nonintractable epilepsy without status epilepticus, unspecif Don’t forget strength training with weights and resistance Set goals and track your progress   You don’t need to join a gym. Home exercises work great.  Put more priority on exercise in your life                    Visit [facility] online at

## (undated) NOTE — LETTER
[address] [facility] [facility] [address], [address] [facility]  [phone]        Dear Aditya Liriano. Ron, ,      I had the pleasure of seeing your patient, Serafin Harkins on 9/7/2017.      Below please Problem Relation Age of Onset   • Hypertension Father    • Other[other] [OTHER] Father 76     CVA   • Diabetes Mother    • Lipids Mother    • Hypertension Mother       Social History:  Social History    Marital status: Single              Spouse name: Cranial Nerves: II-Visual acuity grossly normal, with full visual fields. Pupil react to light. Fundoscopic exam normal.  III,IV,VI- EOM full, with normal pursuit. V-Facial sensation intact, with symmetric corneal reflex. VII- face symmetric.  VIII- Audito

## (undated) NOTE — Clinical Note
[address] [facility] [place]  [address], [address]  [address] [facility] [address] [facility] [phone]        Dear Jaja Grubbs. Ron, DO,      I had the pleasure of seeing your patient, Jayla Gimenez on 3/27/2017.      Below please find a darby Past Surgical History    OTHER SURGICAL HISTORY  1997    Comment LEFT ARM TUMOR NONCANCEROUS      Family History   Problem Relation Age of Onset   • Hypertension Father    • Other[other] [OTHER] Father 76     CVA   • Diabetes Mother  • Lipids Mother    • H Cranial Nerves: II-Visual acuity grossly normal, with full visual fields. Pupil react to light. Fundoscopic exam normal.  III,IV,VI- EOM full, with normal pursuit. V-Facial sensation intact, with symmetric corneal reflex. VII- face symmetric.  VIII- Audito

## (undated) NOTE — LETTER
AUTHORIZATION FOR SURGICAL OPERATION OR OTHER PROCEDURE    1. I hereby authorize Dr. Leroy Velasquez, and [facility] staff assigned to my case to perform the following operation and/or procedure at the [facility] site:    _______________________________________________________________________________________________    Right shoulder corticoid steroid injection   _______________________________________________________________________________________________    2.  My physician has explained the nature and purpose of the operation or other procedure, possible alternative methods of treatment, the risks involved, and the possibility of complication to me.  I acknowledge that no guarantee has been made as to the result that may be obtained.  3.  I recognize that, during the course of this operation, or other procedure, unforseen conditions may necessitate additional or different procedure than those listed above.  I, therefore, further authorize and request that the above named physician, his/her physician assistants or designees perform such procedures as are, in his/her professional opinion, necessary and desirable.  4.  Any tissue or organs removed in the operation or other procedure may be disposed of by and at the discretion of the [facility] and [facility].  5.  I understand that in the event of a medical emergency, I will be transported by local paramedics to [facility] or other hospital emergency department.  6.  I certify that I have read and fully understand the above consent to operation and/or other procedure.    7.  I acknowledge that my physician has explained sedation/analgesia administration to me including the risks and benefits.  I consent to the administration of sedation/analgesia as may be necessary or desirable in the judgement of my physician.    Witness signature: ___________________________________________________ Date:   ______/______/_____                    Time:  ________ A.M.  P.M.       Patient Name:  ______________________________________________________  (please print)      Patient signature:  ___________________________________________________             Relationship to Patient:           []  Parent    Responsible person                          []  Spouse  In case of minor or                    [] Other  _____________   Incompetent name:  __________________________________________________                               (please print)      _____________      Responsible person  In case of minor or  Incompetent signature:  _______________________________________________    Statement of Physician  My signature below affirms that prior to the time of the procedure, I have explained to the patient and/or his/her guardian, the risks and benefits involved in the proposed treatment and any reasonable alternative to the proposed treatment.  I have also explained the risks and benefits involved in the refusal of the proposed treatment and have answered the patient's questions.                        Date:  ______/______/_______  Provider                      Signature:  __________________________________________________________       Time:  ___________ A.M    P.M.

## (undated) NOTE — MR AVS SNAPSHOT
[facility]  [address], [address] [facility]  [address] [place] [phone]               Thank you for choosing us for your health care visit with Jarrett Yoo MD, MD.  We are glad to serve you and happy to provide you with this summary o ? To best provide you care, patients receiving routine medications need to be seen at least once a year.  protocol for controlled substances:  Written prescriptions    ? Written prescriptions must be picked up in office. ?  Please allow the offic Today's Vital Signs     BP Pulse Height Weight BMI    118/74 mmHg 86 62\" 144 lb 26.33 kg/m2         Current Medications          This list is accurate as of: 3/27/17  3:59 PM.  Always use your most recent med list.                levETIRAcetam 500 MG Tabs